# Patient Record
Sex: MALE | Race: BLACK OR AFRICAN AMERICAN | NOT HISPANIC OR LATINO | URBAN - METROPOLITAN AREA
[De-identification: names, ages, dates, MRNs, and addresses within clinical notes are randomized per-mention and may not be internally consistent; named-entity substitution may affect disease eponyms.]

---

## 2023-04-14 DIAGNOSIS — E11.9 TYPE 2 DIABETES MELLITUS WITHOUT COMPLICATION, UNSPECIFIED WHETHER LONG TERM INSULIN USE (MULTI): ICD-10-CM

## 2023-04-17 RX ORDER — SEMAGLUTIDE 2.68 MG/ML
INJECTION, SOLUTION SUBCUTANEOUS
Qty: 9 ML | Refills: 0 | Status: SHIPPED | OUTPATIENT
Start: 2023-04-17 | End: 2023-08-11

## 2023-04-29 ASSESSMENT — PROMIS GLOBAL HEALTH SCALE
CARRYOUT_PHYSICAL_ACTIVITIES: COMPLETELY
RATE_AVERAGE_PAIN: 2
RATE_AVERAGE_FATIGUE: MODERATE
RATE_QUALITY_OF_LIFE: VERY GOOD
RATE_SOCIAL_SATISFACTION: VERY GOOD
RATE_PHYSICAL_HEALTH: GOOD
EMOTIONAL_PROBLEMS: NEVER
RATE_MENTAL_HEALTH: VERY GOOD
CARRYOUT_SOCIAL_ACTIVITIES: EXCELLENT
RATE_GENERAL_HEALTH: GOOD

## 2023-05-01 ENCOUNTER — OFFICE VISIT (OUTPATIENT)
Dept: PRIMARY CARE | Facility: CLINIC | Age: 38
End: 2023-05-01
Payer: COMMERCIAL

## 2023-05-01 ENCOUNTER — LAB (OUTPATIENT)
Dept: LAB | Facility: LAB | Age: 38
End: 2023-05-01
Payer: COMMERCIAL

## 2023-05-01 VITALS
HEIGHT: 78 IN | HEART RATE: 81 BPM | TEMPERATURE: 97.1 F | BODY MASS INDEX: 36.45 KG/M2 | DIASTOLIC BLOOD PRESSURE: 88 MMHG | WEIGHT: 315 LBS | SYSTOLIC BLOOD PRESSURE: 138 MMHG | OXYGEN SATURATION: 97 %

## 2023-05-01 DIAGNOSIS — R40.0 DAYTIME SLEEPINESS: ICD-10-CM

## 2023-05-01 DIAGNOSIS — E11.9 TYPE 2 DIABETES MELLITUS WITHOUT COMPLICATION, WITHOUT LONG-TERM CURRENT USE OF INSULIN (MULTI): ICD-10-CM

## 2023-05-01 DIAGNOSIS — E66.01 CLASS 3 SEVERE OBESITY WITH SERIOUS COMORBIDITY AND BODY MASS INDEX (BMI) OF 45.0 TO 49.9 IN ADULT, UNSPECIFIED OBESITY TYPE (MULTI): ICD-10-CM

## 2023-05-01 DIAGNOSIS — Z00.00 WELLNESS EXAMINATION: Primary | ICD-10-CM

## 2023-05-01 PROBLEM — E55.9 VITAMIN D DEFICIENCY: Status: ACTIVE | Noted: 2023-05-01

## 2023-05-01 PROBLEM — M25.561 CHRONIC PAIN OF BOTH KNEES: Status: ACTIVE | Noted: 2023-05-01

## 2023-05-01 PROBLEM — M54.30 SCIATIC LEG PAIN: Status: ACTIVE | Noted: 2023-05-01

## 2023-05-01 PROBLEM — L72.3 SEBACEOUS CYST: Status: ACTIVE | Noted: 2023-05-01

## 2023-05-01 PROBLEM — D64.9 ABSOLUTE ANEMIA: Status: ACTIVE | Noted: 2023-05-01

## 2023-05-01 PROBLEM — J45.909 ASTHMA (HHS-HCC): Status: ACTIVE | Noted: 2023-05-01

## 2023-05-01 PROBLEM — K21.9 GERD (GASTROESOPHAGEAL REFLUX DISEASE): Status: ACTIVE | Noted: 2023-05-01

## 2023-05-01 PROBLEM — G89.29 CHRONIC PAIN OF BOTH KNEES: Status: ACTIVE | Noted: 2023-05-01

## 2023-05-01 PROBLEM — E66.09 EXOGENOUS OBESITY: Status: ACTIVE | Noted: 2023-05-01

## 2023-05-01 PROBLEM — R21 RASH OF FOOT: Status: ACTIVE | Noted: 2023-05-01

## 2023-05-01 PROBLEM — M25.562 CHRONIC PAIN OF BOTH KNEES: Status: ACTIVE | Noted: 2023-05-01

## 2023-05-01 PROBLEM — E03.8 SUBCLINICAL HYPOTHYROIDISM: Status: ACTIVE | Noted: 2023-05-01

## 2023-05-01 PROBLEM — R41.3 MEMORY CHANGE: Status: ACTIVE | Noted: 2023-05-01

## 2023-05-01 PROBLEM — M54.50 LOW BACK PAIN: Status: ACTIVE | Noted: 2023-05-01

## 2023-05-01 PROBLEM — R06.81 WITNESSED EPISODE OF APNEA: Status: ACTIVE | Noted: 2023-05-01

## 2023-05-01 PROBLEM — R53.82 CHRONIC FATIGUE: Status: ACTIVE | Noted: 2023-05-01

## 2023-05-01 LAB
ALANINE AMINOTRANSFERASE (SGPT) (U/L) IN SER/PLAS: 19 U/L (ref 10–52)
ALBUMIN (G/DL) IN SER/PLAS: 4.4 G/DL (ref 3.4–5)
ALKALINE PHOSPHATASE (U/L) IN SER/PLAS: 94 U/L (ref 33–120)
ANION GAP IN SER/PLAS: 11 MMOL/L (ref 10–20)
ASPARTATE AMINOTRANSFERASE (SGOT) (U/L) IN SER/PLAS: 27 U/L (ref 9–39)
BASOPHILS (10*3/UL) IN BLOOD BY AUTOMATED COUNT: 0.05 X10E9/L (ref 0–0.1)
BASOPHILS/100 LEUKOCYTES IN BLOOD BY AUTOMATED COUNT: 0.6 % (ref 0–2)
BILIRUBIN TOTAL (MG/DL) IN SER/PLAS: 0.6 MG/DL (ref 0–1.2)
CALCIUM (MG/DL) IN SER/PLAS: 10.4 MG/DL (ref 8.6–10.6)
CARBON DIOXIDE, TOTAL (MMOL/L) IN SER/PLAS: 33 MMOL/L (ref 21–32)
CHLORIDE (MMOL/L) IN SER/PLAS: 104 MMOL/L (ref 98–107)
CHOLESTEROL (MG/DL) IN SER/PLAS: 169 MG/DL (ref 0–199)
CHOLESTEROL IN HDL (MG/DL) IN SER/PLAS: 51.7 MG/DL
CHOLESTEROL IN LDL (MG/DL) IN SER/PLAS BY DIRECT ASSAY: 96 MG/DL (ref 0–129)
CHOLESTEROL/HDL RATIO: 3.3
CREATININE (MG/DL) IN SER/PLAS: 0.96 MG/DL (ref 0.5–1.3)
EOSINOPHILS (10*3/UL) IN BLOOD BY AUTOMATED COUNT: 0 X10E9/L (ref 0–0.7)
EOSINOPHILS/100 LEUKOCYTES IN BLOOD BY AUTOMATED COUNT: 0 % (ref 0–6)
ERYTHROCYTE DISTRIBUTION WIDTH (RATIO) BY AUTOMATED COUNT: 14 % (ref 11.5–14.5)
ERYTHROCYTE MEAN CORPUSCULAR HEMOGLOBIN CONCENTRATION (G/DL) BY AUTOMATED: 30.8 G/DL (ref 32–36)
ERYTHROCYTE MEAN CORPUSCULAR VOLUME (FL) BY AUTOMATED COUNT: 90 FL (ref 80–100)
ERYTHROCYTES (10*6/UL) IN BLOOD BY AUTOMATED COUNT: 4.78 X10E12/L (ref 4.5–5.9)
ESTIMATED AVERAGE GLUCOSE FOR HBA1C: 114 MG/DL
GFR MALE: >90 ML/MIN/1.73M2
GLUCOSE (MG/DL) IN SER/PLAS: 89 MG/DL (ref 74–99)
HEMATOCRIT (%) IN BLOOD BY AUTOMATED COUNT: 43.2 % (ref 41–52)
HEMOGLOBIN (G/DL) IN BLOOD: 13.3 G/DL (ref 13.5–17.5)
HEMOGLOBIN A1C/HEMOGLOBIN TOTAL IN BLOOD: 5.6 %
IMMATURE GRANULOCYTES/100 LEUKOCYTES IN BLOOD BY AUTOMATED COUNT: 0.2 % (ref 0–0.9)
LEUKOCYTES (10*3/UL) IN BLOOD BY AUTOMATED COUNT: 8.7 X10E9/L (ref 4.4–11.3)
LYMPHOCYTES (10*3/UL) IN BLOOD BY AUTOMATED COUNT: 2.3 X10E9/L (ref 1.2–4.8)
LYMPHOCYTES/100 LEUKOCYTES IN BLOOD BY AUTOMATED COUNT: 26.4 % (ref 13–44)
MONOCYTES (10*3/UL) IN BLOOD BY AUTOMATED COUNT: 0.57 X10E9/L (ref 0.1–1)
MONOCYTES/100 LEUKOCYTES IN BLOOD BY AUTOMATED COUNT: 6.6 % (ref 2–10)
NEUTROPHILS (10*3/UL) IN BLOOD BY AUTOMATED COUNT: 5.76 X10E9/L (ref 1.2–7.7)
NEUTROPHILS/100 LEUKOCYTES IN BLOOD BY AUTOMATED COUNT: 66.2 % (ref 40–80)
NON-HDL CHOLESTEROL: 117 MG/DL
NRBC (PER 100 WBCS) BY AUTOMATED COUNT: 0 /100 WBC (ref 0–0)
PLATELETS (10*3/UL) IN BLOOD AUTOMATED COUNT: 157 X10E9/L (ref 150–450)
POTASSIUM (MMOL/L) IN SER/PLAS: 5.5 MMOL/L (ref 3.5–5.3)
PROTEIN TOTAL: 7.9 G/DL (ref 6.4–8.2)
SODIUM (MMOL/L) IN SER/PLAS: 142 MMOL/L (ref 136–145)
UREA NITROGEN (MG/DL) IN SER/PLAS: 13 MG/DL (ref 6–23)

## 2023-05-01 PROCEDURE — 3008F BODY MASS INDEX DOCD: CPT | Performed by: FAMILY MEDICINE

## 2023-05-01 PROCEDURE — 83718 ASSAY OF LIPOPROTEIN: CPT

## 2023-05-01 PROCEDURE — 85025 COMPLETE CBC W/AUTO DIFF WBC: CPT

## 2023-05-01 PROCEDURE — 36415 COLL VENOUS BLD VENIPUNCTURE: CPT

## 2023-05-01 PROCEDURE — 83721 ASSAY OF BLOOD LIPOPROTEIN: CPT

## 2023-05-01 PROCEDURE — 84439 ASSAY OF FREE THYROXINE: CPT

## 2023-05-01 PROCEDURE — 82465 ASSAY BLD/SERUM CHOLESTEROL: CPT

## 2023-05-01 PROCEDURE — 3079F DIAST BP 80-89 MM HG: CPT | Performed by: FAMILY MEDICINE

## 2023-05-01 PROCEDURE — 84443 ASSAY THYROID STIM HORMONE: CPT

## 2023-05-01 PROCEDURE — 83036 HEMOGLOBIN GLYCOSYLATED A1C: CPT

## 2023-05-01 PROCEDURE — 3075F SYST BP GE 130 - 139MM HG: CPT | Performed by: FAMILY MEDICINE

## 2023-05-01 PROCEDURE — 99396 PREV VISIT EST AGE 40-64: CPT | Performed by: FAMILY MEDICINE

## 2023-05-01 PROCEDURE — 82043 UR ALBUMIN QUANTITATIVE: CPT

## 2023-05-01 PROCEDURE — 82570 ASSAY OF URINE CREATININE: CPT

## 2023-05-01 PROCEDURE — 1036F TOBACCO NON-USER: CPT | Performed by: FAMILY MEDICINE

## 2023-05-01 PROCEDURE — 80053 COMPREHEN METABOLIC PANEL: CPT

## 2023-05-01 PROCEDURE — 99213 OFFICE O/P EST LOW 20 MIN: CPT | Performed by: FAMILY MEDICINE

## 2023-05-01 RX ORDER — ACETAMINOPHEN 500 MG
1 TABLET ORAL DAILY
COMMUNITY
Start: 2016-07-19

## 2023-05-01 RX ORDER — METFORMIN HYDROCHLORIDE 500 MG/1
1 TABLET, EXTENDED RELEASE ORAL DAILY
COMMUNITY
Start: 2022-01-25 | End: 2023-09-05

## 2023-05-01 RX ORDER — DESVENLAFAXINE SUCCINATE 50 MG/1
TABLET, EXTENDED RELEASE ORAL
COMMUNITY
Start: 2023-03-28

## 2023-05-01 RX ORDER — CELECOXIB 200 MG/1
CAPSULE ORAL
COMMUNITY
Start: 2016-10-10 | End: 2023-08-21 | Stop reason: SDUPTHER

## 2023-05-01 ASSESSMENT — LIFESTYLE VARIABLES
HOW OFTEN DO YOU HAVE SIX OR MORE DRINKS ON ONE OCCASION: NEVER
SKIP TO QUESTIONS 9-10: 1
HOW OFTEN DO YOU HAVE A DRINK CONTAINING ALCOHOL: NEVER
AUDIT-C TOTAL SCORE: 0
HOW MANY STANDARD DRINKS CONTAINING ALCOHOL DO YOU HAVE ON A TYPICAL DAY: PATIENT DOES NOT DRINK

## 2023-05-01 ASSESSMENT — PATIENT HEALTH QUESTIONNAIRE - PHQ9
1. LITTLE INTEREST OR PLEASURE IN DOING THINGS: NOT AT ALL
SUM OF ALL RESPONSES TO PHQ9 QUESTIONS 1 & 2: 0
2. FEELING DOWN, DEPRESSED OR HOPELESS: NOT AT ALL

## 2023-05-01 NOTE — PROGRESS NOTES
"Subjective   Patient ID: Bert Abraham is a 38 y.o. male who presents for Annual Exam.    HPI     Depression  Followed by psych  Started on desvenlafaxin- states is helping    +Daytime sleepiness      Social:  [employer], thinking about change  - wife  No children, dog and cat  Hobbies/ likes- roleplaying/ 3d design/  board games      Review of Systems  All systems reviewed and neg if not noted in the HPI above   Objective   /88   Pulse 81   Temp 36.2 °C (97.1 °F)   Ht 1.981 m (6' 6\")   Wt (!) 191 kg (421 lb 9.6 oz)   SpO2 97%   BMI 48.72 kg/m²     Physical Exam  Pleasant  Eyes: conjunctiva non-icteric and eye lids are without obvious rash or drooping. Pupils are symmetric.   Ears, Nose, Mouth, and Throat: External ears and nose appear to be without deformity or rash. No lesions or masses noted. Hearing is grossly intact.   CV: RRR, no murmur  Carotids: no bruits  Pulm:CTA B/L  Abd: soft, NTTP, + BS  LE: no edema  Psychiatric: Alert, orientation to person, place, and time. Recent/remote memory as evidenced through face-to-face interaction and discussion appear grossly intact. Mood and affect are normal.     Assessment/Plan   Problem List Items Addressed This Visit          Nervous    Daytime sleepiness    Relevant Orders    Referral to Adult Sleep Medicine    In-Center Sleep Study (Non-Sleep Provider Only)     Other Visit Diagnoses       Wellness examination    -  Primary    Type 2 diabetes mellitus without complication, without long-term current use of insulin (CMS/HCC)        Relevant Orders    Albumin , Urine Random    CBC and Auto Differential    Comprehensive Metabolic Panel    Hemoglobin A1C    TSH with reflex to Free T4 if abnormal    Lipid Panel Non-Fasting    Cholesterol, LDL Direct    Class 3 severe obesity with serious comorbidity and body mass index (BMI) of 45.0 to 49.9 in adult, unspecified obesity type (CMS/HCC)        Relevant Orders    Albumin , Urine Random    CBC and Auto " Differential    Comprehensive Metabolic Panel    Hemoglobin A1C    TSH with reflex to Free T4 if abnormal    Lipid Panel Non-Fasting    Cholesterol, LDL Direct

## 2023-05-01 NOTE — PATIENT INSTRUCTIONS
Labs today    Ordered sleep study  Referral for sleepmed    Continue the ozempic  Congrats on your 70+ wt loss  Continue to work on healthy diet and exercise  We encourage all patients to engage in exercise 150 minutes per week   Adults 18 and older, activity during each week - if you are able:    Engage in at least 150 minutes of moderate or vigorous exercise per week   If you are able- Engage in muscle strengthening activity on 2 or more days per week          Please follow up in 6months for DM/ sleep or as needed.       ** If labs or imaging ordered at today's visit, all the non-urgent results will be discussed at your next visit    If you have been referred for a special test or to a specialist please call  [phone] to schedule an appointment.  If you have any further questions, or if develop new or worsened symptoms, please give our office a call at [phone].

## 2023-05-02 LAB
ALBUMIN (MG/L) IN URINE: 8 MG/L
ALBUMIN/CREATININE (UG/MG) IN URINE: 4.6 UG/MG CRT (ref 0–30)
CREATININE (MG/DL) IN URINE: 174 MG/DL (ref 20–370)
THYROTROPIN (MIU/L) IN SER/PLAS BY DETECTION LIMIT <= 0.05 MIU/L: 4.92 MIU/L (ref 0.44–3.98)
THYROXINE (T4) FREE (NG/DL) IN SER/PLAS: 1.05 NG/DL (ref 0.78–1.48)

## 2023-05-10 LAB
ALBUMIN (MG/L) IN URINE: NORMAL MG/L
ALBUMIN/CREATININE (UG/MG) IN URINE: NORMAL UG/MG CRT (ref 0–30)
CREATININE (MG/DL) IN URINE: NORMAL MG/DL (ref 20–370)

## 2023-07-31 DIAGNOSIS — E11.9 TYPE 2 DIABETES MELLITUS WITHOUT COMPLICATION, UNSPECIFIED WHETHER LONG TERM INSULIN USE (MULTI): ICD-10-CM

## 2023-08-11 RX ORDER — SEMAGLUTIDE 2.68 MG/ML
2 INJECTION, SOLUTION SUBCUTANEOUS
Qty: 9 ML | Refills: 1 | Status: SHIPPED
Start: 2023-08-11 | End: 2023-11-13 | Stop reason: ALTCHOICE

## 2023-08-21 DIAGNOSIS — G89.29 CHRONIC PAIN OF BOTH KNEES: ICD-10-CM

## 2023-08-21 DIAGNOSIS — M25.562 CHRONIC PAIN OF BOTH KNEES: ICD-10-CM

## 2023-08-21 DIAGNOSIS — E11.9 TYPE 2 DIABETES MELLITUS WITHOUT COMPLICATION, UNSPECIFIED WHETHER LONG TERM INSULIN USE (MULTI): Primary | ICD-10-CM

## 2023-08-21 DIAGNOSIS — M25.561 CHRONIC PAIN OF BOTH KNEES: ICD-10-CM

## 2023-08-21 DIAGNOSIS — M54.50 LOW BACK PAIN, UNSPECIFIED BACK PAIN LATERALITY, UNSPECIFIED CHRONICITY, UNSPECIFIED WHETHER SCIATICA PRESENT: Primary | ICD-10-CM

## 2023-08-30 RX ORDER — CELECOXIB 200 MG/1
200 CAPSULE ORAL
Qty: 90 CAPSULE | Refills: 1 | Status: SHIPPED | OUTPATIENT
Start: 2023-08-30

## 2023-09-05 RX ORDER — CELECOXIB 200 MG/1
200 CAPSULE ORAL DAILY PRN
Qty: 90 CAPSULE | Refills: 1 | OUTPATIENT
Start: 2023-09-05

## 2023-09-05 RX ORDER — METFORMIN HYDROCHLORIDE 500 MG/1
500 TABLET, EXTENDED RELEASE ORAL DAILY
Qty: 90 TABLET | Refills: 2 | Status: SHIPPED | OUTPATIENT
Start: 2023-09-05

## 2023-11-13 ENCOUNTER — LAB (OUTPATIENT)
Dept: LAB | Facility: LAB | Age: 38
End: 2023-11-13
Payer: COMMERCIAL

## 2023-11-13 ENCOUNTER — OFFICE VISIT (OUTPATIENT)
Dept: PRIMARY CARE | Facility: CLINIC | Age: 38
End: 2023-11-13
Payer: COMMERCIAL

## 2023-11-13 VITALS
BODY MASS INDEX: 36.45 KG/M2 | SYSTOLIC BLOOD PRESSURE: 128 MMHG | DIASTOLIC BLOOD PRESSURE: 70 MMHG | WEIGHT: 315 LBS | HEIGHT: 78 IN

## 2023-11-13 DIAGNOSIS — G89.29 CHRONIC LOW BACK PAIN WITHOUT SCIATICA, UNSPECIFIED BACK PAIN LATERALITY: ICD-10-CM

## 2023-11-13 DIAGNOSIS — R40.0 DAYTIME SLEEPINESS: ICD-10-CM

## 2023-11-13 DIAGNOSIS — G89.29 CHRONIC PAIN OF LEFT KNEE: ICD-10-CM

## 2023-11-13 DIAGNOSIS — I83.92 VARICOSE VEINS OF LEFT LOWER EXTREMITY, UNSPECIFIED WHETHER COMPLICATED: ICD-10-CM

## 2023-11-13 DIAGNOSIS — E03.8 SUBCLINICAL HYPOTHYROIDISM: ICD-10-CM

## 2023-11-13 DIAGNOSIS — M54.50 CHRONIC LOW BACK PAIN WITHOUT SCIATICA, UNSPECIFIED BACK PAIN LATERALITY: ICD-10-CM

## 2023-11-13 DIAGNOSIS — M25.562 CHRONIC PAIN OF LEFT KNEE: ICD-10-CM

## 2023-11-13 DIAGNOSIS — D64.9 ANEMIA, UNSPECIFIED TYPE: ICD-10-CM

## 2023-11-13 DIAGNOSIS — R73.03 PREDIABETES: Primary | ICD-10-CM

## 2023-11-13 DIAGNOSIS — R73.03 PREDIABETES: ICD-10-CM

## 2023-11-13 LAB
ANION GAP SERPL CALC-SCNC: 13 MMOL/L (ref 10–20)
BASOPHILS # BLD AUTO: 0.04 X10*3/UL (ref 0–0.1)
BASOPHILS NFR BLD AUTO: 0.5 %
BUN SERPL-MCNC: 12 MG/DL (ref 6–23)
CALCIUM SERPL-MCNC: 9.6 MG/DL (ref 8.6–10.6)
CHLORIDE SERPL-SCNC: 106 MMOL/L (ref 98–107)
CO2 SERPL-SCNC: 28 MMOL/L (ref 21–32)
CREAT SERPL-MCNC: 1.1 MG/DL (ref 0.5–1.3)
EOSINOPHIL # BLD AUTO: 0 X10*3/UL (ref 0–0.7)
EOSINOPHIL NFR BLD AUTO: 0 %
ERYTHROCYTE [DISTWIDTH] IN BLOOD BY AUTOMATED COUNT: 14.2 % (ref 11.5–14.5)
GFR SERPL CREATININE-BSD FRML MDRD: 87 ML/MIN/1.73M*2
GLUCOSE SERPL-MCNC: 81 MG/DL (ref 74–99)
HCT VFR BLD AUTO: 40.9 % (ref 41–52)
HGB BLD-MCNC: 12.6 G/DL (ref 13.5–17.5)
IMM GRANULOCYTES # BLD AUTO: 0.04 X10*3/UL (ref 0–0.7)
IMM GRANULOCYTES NFR BLD AUTO: 0.5 % (ref 0–0.9)
IRON SATN MFR SERPL: 25 % (ref 25–45)
IRON SERPL-MCNC: 71 UG/DL (ref 35–150)
LYMPHOCYTES # BLD AUTO: 2.28 X10*3/UL (ref 1.2–4.8)
LYMPHOCYTES NFR BLD AUTO: 26.2 %
MCH RBC QN AUTO: 28.4 PG (ref 26–34)
MCHC RBC AUTO-ENTMCNC: 30.8 G/DL (ref 32–36)
MCV RBC AUTO: 92 FL (ref 80–100)
MONOCYTES # BLD AUTO: 0.74 X10*3/UL (ref 0.1–1)
MONOCYTES NFR BLD AUTO: 8.5 %
NEUTROPHILS # BLD AUTO: 5.6 X10*3/UL (ref 1.2–7.7)
NEUTROPHILS NFR BLD AUTO: 64.3 %
NRBC BLD-RTO: 0 /100 WBCS (ref 0–0)
PLATELET # BLD AUTO: 201 X10*3/UL (ref 150–450)
POTASSIUM SERPL-SCNC: 4.8 MMOL/L (ref 3.5–5.3)
RBC # BLD AUTO: 4.43 X10*6/UL (ref 4.5–5.9)
SODIUM SERPL-SCNC: 142 MMOL/L (ref 136–145)
T3FREE SERPL-MCNC: 3.2 PG/ML (ref 2.3–4.2)
T4 FREE SERPL-MCNC: 0.91 NG/DL (ref 0.78–1.48)
TIBC SERPL-MCNC: 289 UG/DL (ref 240–445)
TSH SERPL-ACNC: 9.39 MIU/L (ref 0.44–3.98)
UIBC SERPL-MCNC: 218 UG/DL (ref 110–370)
WBC # BLD AUTO: 8.7 X10*3/UL (ref 4.4–11.3)

## 2023-11-13 PROCEDURE — 84439 ASSAY OF FREE THYROXINE: CPT

## 2023-11-13 PROCEDURE — 85025 COMPLETE CBC W/AUTO DIFF WBC: CPT

## 2023-11-13 PROCEDURE — 84443 ASSAY THYROID STIM HORMONE: CPT

## 2023-11-13 PROCEDURE — 84481 FREE ASSAY (FT-3): CPT

## 2023-11-13 PROCEDURE — 3008F BODY MASS INDEX DOCD: CPT | Performed by: FAMILY MEDICINE

## 2023-11-13 PROCEDURE — 80048 BASIC METABOLIC PNL TOTAL CA: CPT

## 2023-11-13 PROCEDURE — 36415 COLL VENOUS BLD VENIPUNCTURE: CPT

## 2023-11-13 PROCEDURE — 83550 IRON BINDING TEST: CPT

## 2023-11-13 PROCEDURE — 99214 OFFICE O/P EST MOD 30 MIN: CPT | Performed by: FAMILY MEDICINE

## 2023-11-13 PROCEDURE — 83540 ASSAY OF IRON: CPT

## 2023-11-13 PROCEDURE — 1036F TOBACCO NON-USER: CPT | Performed by: FAMILY MEDICINE

## 2023-11-13 ASSESSMENT — ENCOUNTER SYMPTOMS
DEPRESSION: 0
OCCASIONAL FEELINGS OF UNSTEADINESS: 0
LOSS OF SENSATION IN FEET: 0

## 2023-11-13 ASSESSMENT — PAIN SCALES - GENERAL: PAINLEVEL: 5

## 2023-11-13 NOTE — PATIENT INSTRUCTIONS
Varicose veins  - referral for vascular    Knee  - xray ordered  - referral for ortho  Ok to continue diclofenac and celebrex    Back  - try PT   - Ok to continue diclofenac and celebrex    Checking labs for the thyroid  - if still off will place referral for endo    Daytime sleepiness  -referral sleep med team and sleep study      Please follow up in 4-5months for knee/ back or as needed.       ** If labs or imaging ordered at today's visit, all the non-urgent results will be discussed at your next visit    If you have been referred for a special test or to a specialist please call  [phone] to schedule an appointment.  If you have any further questions, or if develop new or worsened symptoms, please give our office a call at [phone].

## 2023-11-13 NOTE — PROGRESS NOTES
"Subjective   Patient ID: Bert Abraham is a 38 y.o. male who presents for Follow-up, Knee Pain (3/10 x3 month), and Back Pain (5/10 x6 weeks).    HPI       Knee Pain (3/10 x3 month)  Pain and \"puffy\" under the knee cap  Can feel it \"swelling\"  No trauma to the area  Pain is worsened with stairs and sitting ( with flexion)  Using diclofenac and celebrex which help some    Back Pain (5/10 x6 weeks).  Worsened with bending and twisting, heavy lifting  prednisone helps  Using diclofenac and celebrex which help some  Denies loss of control of bowel or bladder, saddle paresthesia, or leg paresthesia  Questions if with leg weakness??    Still with daytime sleepiness  Referral for sleep med and study needed again    Review of Systems  All systems reviewed and neg if not noted in the HPI above     Objective   /70 (BP Location: Left arm, Patient Position: Sitting, BP Cuff Size: Large adult)   Ht 1.981 m (6' 6\")   Wt (!) 204 kg (449 lb 8 oz)   BMI 51.94 kg/m²     Physical Exam    Patient appears to be in no pain, non-antalgic gait noted.   paraspinal muscles TTP at L5-S1  No masses.  Painful and reduced LS ROM noted.   Straight leg raise is neg   DTR's, motor strength and sensation normal, including heel and toe gait.    Peripheral pulses are palpable.    Knee with swelling at the lateral joint line on the left knee.  There are several varicose veins over the area and throughout the leg itself  Pain with flexion of the knee    Assessment/Plan   Problem List Items Addressed This Visit             ICD-10-CM    Absolute anemia D64.9    Relevant Orders    Iron and TIBC    Daytime sleepiness R40.0    Relevant Orders    In-Center Sleep Study (Non-Sleep Provider Only)    Referral to Adult Sleep Medicine    CBC and Auto Differential    Basic Metabolic Panel    TSH    T4, free    T3, free    Iron and TIBC    Low back pain M54.50    Relevant Orders    CBC and Auto Differential    Basic Metabolic Panel    Referral to Physical " Therapy    Subclinical hypothyroidism E03.8    Relevant Orders    TSH    T4, free    T3, free     Other Visit Diagnoses         Codes    Prediabetes    -  Primary R73.03    Relevant Orders    CBC and Auto Differential    Basic Metabolic Panel    TSH    Chronic pain of left knee     M25.562, G89.29    Relevant Orders    Referral to Orthopaedic Surgery    XR knee left 3 views    Varicose veins of left lower extremity, unspecified whether complicated     I83.92    Relevant Orders    Referral to Vascular Medicine          Please follow up in 4-5months for knee/ back or as needed.   Time Spent  Time spent directly with patient, family or caregiver: 25 minutes  Documentation Time: 5 minutes

## 2023-12-06 ENCOUNTER — ANCILLARY PROCEDURE (OUTPATIENT)
Dept: RADIOLOGY | Facility: CLINIC | Age: 38
End: 2023-12-06
Payer: COMMERCIAL

## 2023-12-06 DIAGNOSIS — M25.562 CHRONIC PAIN OF LEFT KNEE: ICD-10-CM

## 2023-12-06 DIAGNOSIS — G89.29 CHRONIC PAIN OF LEFT KNEE: ICD-10-CM

## 2023-12-06 PROCEDURE — 73562 X-RAY EXAM OF KNEE 3: CPT | Mod: LEFT SIDE | Performed by: RADIOLOGY

## 2023-12-06 PROCEDURE — 73562 X-RAY EXAM OF KNEE 3: CPT | Mod: LT,FY

## 2023-12-07 ENCOUNTER — APPOINTMENT (OUTPATIENT)
Dept: RADIOLOGY | Facility: CLINIC | Age: 38
End: 2023-12-07
Payer: COMMERCIAL

## 2023-12-07 ENCOUNTER — OFFICE VISIT (OUTPATIENT)
Dept: ORTHOPEDIC SURGERY | Facility: CLINIC | Age: 38
End: 2023-12-07
Payer: COMMERCIAL

## 2023-12-07 VITALS — BODY MASS INDEX: 36.45 KG/M2 | WEIGHT: 315 LBS | HEIGHT: 78 IN

## 2023-12-07 DIAGNOSIS — M25.562 LEFT KNEE PAIN, UNSPECIFIED CHRONICITY: ICD-10-CM

## 2023-12-07 DIAGNOSIS — M25.462 EFFUSION OF LEFT KNEE: Primary | ICD-10-CM

## 2023-12-07 DIAGNOSIS — M17.10 ARTHRITIS OF KNEE: ICD-10-CM

## 2023-12-07 DIAGNOSIS — M25.562 CHRONIC PAIN OF LEFT KNEE: ICD-10-CM

## 2023-12-07 DIAGNOSIS — G89.29 CHRONIC PAIN OF LEFT KNEE: ICD-10-CM

## 2023-12-07 PROCEDURE — 99204 OFFICE O/P NEW MOD 45 MIN: CPT | Performed by: ORTHOPAEDIC SURGERY

## 2023-12-07 PROCEDURE — 20610 DRAIN/INJ JOINT/BURSA W/O US: CPT | Performed by: ORTHOPAEDIC SURGERY

## 2023-12-07 PROCEDURE — 99214 OFFICE O/P EST MOD 30 MIN: CPT | Performed by: ORTHOPAEDIC SURGERY

## 2023-12-07 PROCEDURE — 3008F BODY MASS INDEX DOCD: CPT | Performed by: ORTHOPAEDIC SURGERY

## 2023-12-07 PROCEDURE — 1036F TOBACCO NON-USER: CPT | Performed by: ORTHOPAEDIC SURGERY

## 2023-12-07 PROCEDURE — 2500000004 HC RX 250 GENERAL PHARMACY W/ HCPCS (ALT 636 FOR OP/ED): Performed by: ORTHOPAEDIC SURGERY

## 2023-12-07 PROCEDURE — 2500000005 HC RX 250 GENERAL PHARMACY W/O HCPCS: Performed by: ORTHOPAEDIC SURGERY

## 2023-12-07 RX ORDER — METHYLPREDNISOLONE ACETATE 40 MG/ML
40 INJECTION, SUSPENSION INTRA-ARTICULAR; INTRALESIONAL; INTRAMUSCULAR; SOFT TISSUE
Status: COMPLETED | OUTPATIENT
Start: 2023-12-07 | End: 2023-12-07

## 2023-12-07 RX ORDER — LIDOCAINE HYDROCHLORIDE 20 MG/ML
2 INJECTION, SOLUTION INFILTRATION; PERINEURAL
Status: COMPLETED | OUTPATIENT
Start: 2023-12-07 | End: 2023-12-07

## 2023-12-07 RX ADMIN — LIDOCAINE HYDROCHLORIDE 2 ML: 20 INJECTION, SOLUTION INFILTRATION; PERINEURAL at 17:32

## 2023-12-07 RX ADMIN — METHYLPREDNISOLONE ACETATE 40 MG: 40 INJECTION, SUSPENSION INTRALESIONAL; INTRAMUSCULAR; INTRASYNOVIAL; SOFT TISSUE at 17:32

## 2023-12-07 ASSESSMENT — PAIN DESCRIPTION - DESCRIPTORS: DESCRIPTORS: ACHING;SHARP

## 2023-12-07 ASSESSMENT — PAIN SCALES - GENERAL: PAINLEVEL_OUTOF10: 4

## 2023-12-07 ASSESSMENT — PAIN - FUNCTIONAL ASSESSMENT: PAIN_FUNCTIONAL_ASSESSMENT: 0-10

## 2023-12-07 NOTE — PROGRESS NOTES
History of Present Illness  The patient presents with side: left knee pain for 6 months.  The patient complains of worsening pain over the past 6 months.  At present he has tried the following conservative measures including rest, activity modification, oral and topical anti-inflammatories with minimal effect.  He is here interested in any other possible therapeutic intervention.    Smoking  No    BMI  Current BMI is 52    Pain is described as aching    Location: lateral, global  Pain level: 4  Assistive device: is not using any ambulatory assistive devices  History of surgery: No       Review of Systems   GENERAL: Negative for malaise, significant weight loss, fever  MUSCULOSKELETAL: see HPI  NEURO:  Negative     Exam  side: right Knee:  Skin healthy and intact  No gross swelling or ecchymosis  Alignment: normal     Effusion: moderate        ROM: 0 and 130  mild Crepitus with range of motion  Tenderness to palpation over medial and lateral joint line and with patellar compression      No laxity to valgus stress  No laxity to varus stress  Negative Lachman´s test  Negative posterior drawer test  Mild pain with Briana´s test  Logrolling of hip negative  No pain with internal rotation of the hip  Straight leg raise negative  Neurovascular exam normal distally  2+ DP pulse and good cap refill     Radiographs  See dictated report from today     Assessment  Osteoarthrosis side: right knee      Plan  We discussed with the patient the diagnosis of degenerative joint disease of the knee.  We reviewed an evidence-based approach to osteoarthritis of the knee.  We strongly encouraged low-impact aerobic activity and non-opioid analgesics.     We discussed temporary pain relief with corticosteroid injections and the associated risks.  We also discussed the conflicting evidence regarding viscosupplementation and potential long-term risks with NSAID´s.  We reviewed the role of bracing for instability and physical therapy for  atrophy and gait abnormalities.  We reviewed that the only curative process is for a joint arthroplasty though the patient at this time is too young.  The patient was also thoroughly counseled on the effects of weight management. A bariatric referral was placed to help address this.     Under sterile conditions, needle was inserted into the right knee and approximately 10 cc of synovial fluid was aspirated from the knee.  At the same time, a 4:1 mixture of Kenalog and lidocaine was injected into the knee.  The wounds were clean afterwards, and a bandage applied over injection site.     The patient elected for Rest, ice, elevation, Physical therapy, NSAIDS, and Injections    I will see them in 1 month for recheck, sooner if there is any problems.  Questions were answered.    Patient was reviewed and discussed with GLORIA and/or orthopedic resident.  Patient was seen and evaluated in conjunction with GLORIA and/or orthopedic resident. Findings and treatment plan were discussed and approved by myself, Dr. Landeros.  He is working on weight loss.  We discussed the possible bariatric program.  Hopefully the injection can calm his knee down to allow him to burn more calories.  Recommend he follow-up with us in the future if pain worsens.  Can consider repeat injection in the future but advised delay than 4 to 6 months if possible.    Patient ID: Bert Abraham is a 38 y.o. male.    L Inj/Asp: L knee on 12/7/2023 5:32 PM  Indications: pain, joint swelling and diagnostic evaluation  Details: 21 G needle, anterolateral approach  Medications: 40 mg methylPREDNISolone acetate 40 mg/mL; 2 mL lidocaine 20 mg/mL (2 %)  Outcome: tolerated well, no immediate complications  Procedure, treatment alternatives, risks and benefits explained, specific risks discussed. Consent was given by the patient. Immediately prior to procedure a time out was called to verify the correct patient, procedure, equipment, support staff and site/side marked as  required. Patient was prepped and draped in the usual sterile fashion.

## 2024-02-20 ENCOUNTER — APPOINTMENT (OUTPATIENT)
Dept: SLEEP MEDICINE | Facility: HOSPITAL | Age: 39
End: 2024-02-20
Payer: COMMERCIAL

## 2024-03-13 ENCOUNTER — APPOINTMENT (OUTPATIENT)
Dept: PRIMARY CARE | Facility: CLINIC | Age: 39
End: 2024-03-13

## 2024-03-14 ENCOUNTER — CLINICAL SUPPORT (OUTPATIENT)
Dept: SLEEP MEDICINE | Facility: CLINIC | Age: 39
End: 2024-03-14
Payer: COMMERCIAL

## 2024-03-14 DIAGNOSIS — R40.0 DAYTIME SLEEPINESS: ICD-10-CM

## 2024-03-14 DIAGNOSIS — G47.33 OBSTRUCTIVE SLEEP APNEA (ADULT) (PEDIATRIC): ICD-10-CM

## 2024-03-14 PROCEDURE — 95810 POLYSOM 6/> YRS 4/> PARAM: CPT | Performed by: GENERAL PRACTICE

## 2024-03-15 VITALS
HEIGHT: 77 IN | DIASTOLIC BLOOD PRESSURE: 85 MMHG | SYSTOLIC BLOOD PRESSURE: 147 MMHG | BODY MASS INDEX: 37.19 KG/M2 | WEIGHT: 315 LBS

## 2024-03-15 NOTE — PROGRESS NOTES
[facility] TECH NOTE:     Patient: Bert Abraham   MRN//AGE: [id number]  1985  39 y.o.   Technologist: Pal Fuentes   Room: 439A   Service Date: 3/15/2024        Sleep Testing Location: [facility]    Cougar: 7, Neck 50cm    TECHNOLOGIST SLEEP STUDY PROCEDURE NOTE:   This sleep study is being conducted according to the policies and procedures outlined by the [employer] accreditation standards.  The sleep study procedure and processes involved during this appointment was explained to the patient/patient’s family, questions were answered. The patient/family verbalized understanding.      The patient is a 39 y.o. year old male scheduled for a PSG  with montage of:  PSG . He arrived for his appointment.      The study that was ultimately completed was a PSG  with montage of:  PSG .    The full study Was completed.  Patient questionnaires completed?: yes     Consents signed? yes    Initial Fall Risk Screening:     Bert has not fallen in the last 6 months. his did not result in injury. Bert does not have a fear of falling. He does not need assistance with sitting, standing, or walking. He does not need assistance walking in his home. He does not need assistance in an unfamiliar setting. The patient is not using an assistive device.     Brief Study observations: Patient is a 39 year old male, here today for a PSG due to not meeting Split night criteria.     Deviation to order/protocol and reason: None      If PAP, which was preferred mask/pressure/mode:       Other:None    After the procedure, the patient/family was informed to ensure followup with ordering clinician for testing results.      Technologist: Pal Fuentes

## 2024-03-20 ENCOUNTER — APPOINTMENT (OUTPATIENT)
Dept: RADIOLOGY | Facility: HOSPITAL | Age: 39
End: 2024-03-20
Payer: COMMERCIAL

## 2024-03-20 ENCOUNTER — HOSPITAL ENCOUNTER (EMERGENCY)
Facility: HOSPITAL | Age: 39
Discharge: HOME | End: 2024-03-20
Attending: EMERGENCY MEDICINE
Payer: COMMERCIAL

## 2024-03-20 VITALS
HEART RATE: 71 BPM | BODY MASS INDEX: 36.45 KG/M2 | TEMPERATURE: 98.6 F | DIASTOLIC BLOOD PRESSURE: 90 MMHG | OXYGEN SATURATION: 99 % | WEIGHT: 315 LBS | SYSTOLIC BLOOD PRESSURE: 139 MMHG | HEIGHT: 78 IN | RESPIRATION RATE: 20 BRPM

## 2024-03-20 DIAGNOSIS — M79.604 MUSCULOSKELETAL PAIN OF LOWER EXTREMITY, RIGHT: Primary | ICD-10-CM

## 2024-03-20 PROCEDURE — 99284 EMERGENCY DEPT VISIT MOD MDM: CPT | Mod: 25

## 2024-03-20 PROCEDURE — 93971 EXTREMITY STUDY: CPT | Performed by: RADIOLOGY

## 2024-03-20 PROCEDURE — 93971 EXTREMITY STUDY: CPT

## 2024-03-20 ASSESSMENT — PAIN DESCRIPTION - LOCATION
LOCATION: LEG
LOCATION: LEG

## 2024-03-20 ASSESSMENT — PAIN - FUNCTIONAL ASSESSMENT
PAIN_FUNCTIONAL_ASSESSMENT: 0-10
PAIN_FUNCTIONAL_ASSESSMENT: 0-10

## 2024-03-20 ASSESSMENT — PAIN DESCRIPTION - ORIENTATION
ORIENTATION: RIGHT
ORIENTATION: RIGHT

## 2024-03-20 ASSESSMENT — COLUMBIA-SUICIDE SEVERITY RATING SCALE - C-SSRS
2. HAVE YOU ACTUALLY HAD ANY THOUGHTS OF KILLING YOURSELF?: NO
6. HAVE YOU EVER DONE ANYTHING, STARTED TO DO ANYTHING, OR PREPARED TO DO ANYTHING TO END YOUR LIFE?: NO
1. IN THE PAST MONTH, HAVE YOU WISHED YOU WERE DEAD OR WISHED YOU COULD GO TO SLEEP AND NOT WAKE UP?: NO

## 2024-03-20 ASSESSMENT — PAIN DESCRIPTION - DESCRIPTORS: DESCRIPTORS: ACHING;SHARP

## 2024-03-20 ASSESSMENT — PAIN SCALES - GENERAL
PAINLEVEL_OUTOF10: 5 - MODERATE PAIN
PAINLEVEL_OUTOF10: 8

## 2024-03-20 ASSESSMENT — PAIN DESCRIPTION - PAIN TYPE
TYPE: ACUTE PAIN
TYPE: ACUTE PAIN

## 2024-03-20 NOTE — ED TRIAGE NOTES
Pt presents to ED for RLE pain x1 week worsening today with swelling and redness to R calf. Denies SOB/CP, dizziness, abd pain, numbness/ tingling. Pt reports hx of varicose vein removal and residual numbness and reports increase in numbness to RLE today and resolved currently. Hx of sciatica. Pt ambulatory in triage.

## 2024-03-20 NOTE — ED TRIAGE NOTES
TRIAGE NOTE   I saw the patient as the Clinician in Triage and performed a brief history and physical exam, established acuity, and ordered appropriate tests to develop basic plan of care. Patient will be seen by an GLORIA, resident and/or physician who will independently evaluate the patient. Please see subsequent provider notes for further details and disposition.     Brief HPI: In brief, Bert Abraham is a 39 y.o. male that presents for RLE pain and swelling that has been bothering him for a month. Acutely worsened today.  Patient has tried NSAIDs, Flexeril, and steroids that he has because he also has issues with sciatica.  Patient has some numbness in that leg but this is normal from his sciatica and does not feel that it is worse today.  He feels that this pain is different.  No recent long distance travel or history of DVT.    Focused Physical exam:   Swelling to right leg and pain near gastrocnemius, normal pulses     Plan/MDM:   Will obtain duplex of the right lower extremity.    Please see subsequent provider note for further details and disposition     Pia Calhoun PA-C

## 2024-03-21 NOTE — ED PROVIDER NOTES
HPI   Chief Complaint   Patient presents with    Leg Pain       HPI: []  39-year-old  male history of high BMI, sciatica comes in with atraumatic right extremity pain.  He said for last few days he developed right extremity pain.  He thought it was sciatica he is on prednisone and Celebrex pain are getting better.  He was concerned for DVT.  History of varicose veins.  No trauma no falls.  No back pain.  No calf pain.  No chest pain pressure heaviness fever chills nausea vomit diarrhea cough congestion incontinence seizures no trouble breathing no syncope or near syncope no recent travel hospitalization or antibiotic use.    Past history: Sciatica, high BMI, varicose veins  Social: Patient denies current tobacco alcohol drug abuse.  REVIEW OF SYSTEMS:    GENERAL.: No weight loss, fatigue, anorexia, insomnia, fever.    EYES: No vision loss, double vision, drainage, eye pain.    ENT: No pharyngitis, dry mouth.    CARDIOPULMONARY: No chest pain, palpitations, syncope, near syncope. No shortness of breath, cough, hemoptysis.    GI: No abdominal pain, change in bowel habits, melena, hematemesis, hematochezia, nausea, vomiting, diarrhea.    : No discharge, dysuria, frequency, urgency, hematuria.    MS: Positive right lower extremity pain, no, joint pain, joint swelling.    SKIN: No rashes.    PSYCH: No depression, anxiety, suicidality, homicidality.    Review of systems is otherwise negative unless stated above or in history of present illness.  Social history, family history, allergies reviewed.  PHYSICAL EXAM:    GENERAL: Vitals noted, no distress. Alert and oriented  x 3. Non-toxic.  High BMI exam limited    EENT: TMs clear. Posterior oropharynx unremarkable. No meningismus. No LAD.     NECK: Supple. Nontender. No midline tenderness.     CARDIAC: Regular, rate, rhythm. No murmurs rubs or gallops. No JVD    PULMONARY: Lungs clear bilaterally with good aeration. No wheezes rales or rhonchi. No respiratory  distress.     ABDOMEN: Soft, nonsurgical. Nontender. No peritoneal signs. Normoactive bowel sounds. No pulsatile masses.     EXTREMITIES: Left lower extremity no peripheral edema. Negative Homans bilaterally, no cords.  2+ bounding pulses well-perfused  Right lower extremity has varicose veins, no obvious deformity no palpable cords calf is supple soft compartments bounding pulses neurovascular intact is tender palpation over the lateral calf area.  With no palpable muscle defect erythema redness induration.  SKIN: No rash. Intact.   Musculoskeletal: No midline C, T, L-spine tenderness.  No paravertebral tenderness.  NEURO: No focal neurologic deficits, NIH score of 0. Cranial nerves normal as tested from II through XII.  Negative straight leg lower extremities bilaterally.    MEDICAL DECISION MAKING:  Ultrasound right lower extremity negative DVT.    Treatment ED: None    ED course: Patient remained stable hemodynamically.    Impression: Acute musculoskeletal pain    Plan set MDM: 39-year-old male high BMI history varicose veins comes in atraumatic right extremity pain appears mechanical musculoskeletal low concern for DVT compartment syndrome neurovascular compromise or a sciatica.  DVT ultrasound is negative patient reassured be discharged with supportive care leg elevation NSAIDs with pain control close outpatient follow-up instructed with caution.                          No data recorded                   Patient History   Past Medical History:   Diagnosis Date    Crushing injury of right great toe, sequela 10/10/2016    Crushing injury of great toe, right, sequela    Other chronic diseases of tonsils and adenoids 07/13/2016    Tonsil stone    Pain in left ankle and joints of left foot 03/01/2017    Left ankle pain, unspecified chronicity    Pain in left leg 03/01/2017    Musculoskeletal leg pain, left    Pain in left shoulder 03/01/2017    Left shoulder pain, unspecified chronicity    Personal history of  other specified conditions 07/13/2016    History of chronic fatigue    Personal history of other specified conditions 07/13/2016    History of snoring    Sprain of ligaments of lumbar spine, initial encounter 07/13/2016    Low back sprain     No past surgical history on file.  No family history on file.  Social History     Tobacco Use    Smoking status: Never    Smokeless tobacco: Never   Substance Use Topics    Alcohol use: Never    Drug use: Never       Physical Exam   ED Triage Vitals [03/20/24 1701]   Temperature Heart Rate Respirations BP   37 °C (98.6 °F) 73 20 148/74      Pulse Ox Temp Source Heart Rate Source Patient Position   100 % Oral Monitor --      BP Location FiO2 (%)     -- --       Physical Exam    ED Course & MDM   ED Course as of 03/20/24 2113   Wed Mar 20, 2024   2105 On exam patient is comfortable, high BMI exam limited right extremity has no obvious deformity has varicose veins of bounding pulses neurovascular intact is tender palpation over the lateral calf area.  Otherwise calf is supple no palpable cords static negative lower extremities bilaterally.  Neurovascular intact no signs of any neurovascular compromise ultrasound no DVT patient to be discharged with supportive care close outpatient follow-up with strict return precautions. [MT]      ED Course User Index  [MT] Avila Gonzalez MD         Diagnoses as of 03/20/24 2113   Musculoskeletal pain of lower extremity, right       Medical Decision Making      Procedure  Procedures     Avila Gonzalez MD  03/20/24 2116

## 2024-04-09 ENCOUNTER — APPOINTMENT (OUTPATIENT)
Dept: PRIMARY CARE | Facility: CLINIC | Age: 39
End: 2024-04-09
Payer: COMMERCIAL

## 2024-04-23 ENCOUNTER — OFFICE VISIT (OUTPATIENT)
Dept: PRIMARY CARE | Facility: CLINIC | Age: 39
End: 2024-04-23
Payer: COMMERCIAL

## 2024-04-23 ENCOUNTER — LAB (OUTPATIENT)
Dept: LAB | Facility: LAB | Age: 39
End: 2024-04-23
Payer: COMMERCIAL

## 2024-04-23 VITALS
DIASTOLIC BLOOD PRESSURE: 72 MMHG | BODY MASS INDEX: 36.45 KG/M2 | SYSTOLIC BLOOD PRESSURE: 118 MMHG | WEIGHT: 315 LBS | OXYGEN SATURATION: 96 % | HEIGHT: 78 IN | TEMPERATURE: 97 F | HEART RATE: 77 BPM | RESPIRATION RATE: 15 BRPM

## 2024-04-23 DIAGNOSIS — R29.898 WEAKNESS OF RIGHT LOWER EXTREMITY: ICD-10-CM

## 2024-04-23 DIAGNOSIS — E03.8 SUBCLINICAL HYPOTHYROIDISM: ICD-10-CM

## 2024-04-23 DIAGNOSIS — M54.42 CHRONIC BILATERAL LOW BACK PAIN WITH BILATERAL SCIATICA: ICD-10-CM

## 2024-04-23 DIAGNOSIS — E11.9 TYPE 2 DIABETES MELLITUS WITHOUT COMPLICATION, WITHOUT LONG-TERM CURRENT USE OF INSULIN (MULTI): ICD-10-CM

## 2024-04-23 DIAGNOSIS — J45.20 MILD INTERMITTENT ASTHMA, UNSPECIFIED WHETHER COMPLICATED (HHS-HCC): ICD-10-CM

## 2024-04-23 DIAGNOSIS — D64.9 ANEMIA, UNSPECIFIED TYPE: ICD-10-CM

## 2024-04-23 DIAGNOSIS — M54.41 CHRONIC BILATERAL LOW BACK PAIN WITH BILATERAL SCIATICA: ICD-10-CM

## 2024-04-23 DIAGNOSIS — E11.9 TYPE 2 DIABETES MELLITUS WITHOUT COMPLICATION, WITHOUT LONG-TERM CURRENT USE OF INSULIN (MULTI): Primary | ICD-10-CM

## 2024-04-23 DIAGNOSIS — G89.29 CHRONIC BILATERAL LOW BACK PAIN WITH BILATERAL SCIATICA: ICD-10-CM

## 2024-04-23 PROBLEM — R73.09 ELEVATED HEMOGLOBIN A1C: Status: RESOLVED | Noted: 2024-04-23 | Resolved: 2024-04-23

## 2024-04-23 PROBLEM — I83.92 VARICOSE VEINS OF LEFT LOWER EXTREMITY: Status: ACTIVE | Noted: 2024-04-23

## 2024-04-23 PROBLEM — R73.09 ELEVATED HEMOGLOBIN A1C: Status: ACTIVE | Noted: 2024-04-23

## 2024-04-23 PROBLEM — D50.9 IRON DEFICIENCY ANEMIA: Status: ACTIVE | Noted: 2024-04-23

## 2024-04-23 PROBLEM — R06.83 SNORING: Status: ACTIVE | Noted: 2024-04-23

## 2024-04-23 LAB
ALBUMIN SERPL BCP-MCNC: 3.9 G/DL (ref 3.4–5)
ALP SERPL-CCNC: 69 U/L (ref 33–120)
ALT SERPL W P-5'-P-CCNC: 24 U/L (ref 10–52)
ANION GAP SERPL CALC-SCNC: 15 MMOL/L (ref 10–20)
AST SERPL W P-5'-P-CCNC: 15 U/L (ref 9–39)
BASOPHILS # BLD AUTO: 0.04 X10*3/UL (ref 0–0.1)
BASOPHILS NFR BLD AUTO: 0.4 %
BILIRUB SERPL-MCNC: 0.6 MG/DL (ref 0–1.2)
BUN SERPL-MCNC: 12 MG/DL (ref 6–23)
CALCIUM SERPL-MCNC: 9.4 MG/DL (ref 8.6–10.6)
CHLORIDE SERPL-SCNC: 103 MMOL/L (ref 98–107)
CHOLEST SERPL-MCNC: 162 MG/DL (ref 0–199)
CHOLESTEROL/HDL RATIO: 2.5
CO2 SERPL-SCNC: 29 MMOL/L (ref 21–32)
CREAT SERPL-MCNC: 1.06 MG/DL (ref 0.5–1.3)
EGFRCR SERPLBLD CKD-EPI 2021: 90 ML/MIN/1.73M*2
EOSINOPHIL # BLD AUTO: 0.01 X10*3/UL (ref 0–0.7)
EOSINOPHIL NFR BLD AUTO: 0.1 %
ERYTHROCYTE [DISTWIDTH] IN BLOOD BY AUTOMATED COUNT: 14.9 % (ref 11.5–14.5)
EST. AVERAGE GLUCOSE BLD GHB EST-MCNC: 137 MG/DL
FERRITIN SERPL-MCNC: 255 NG/ML (ref 20–300)
FOLATE SERPL-MCNC: 8.1 NG/ML
GLUCOSE SERPL-MCNC: 92 MG/DL (ref 74–99)
HBA1C MFR BLD: 6.4 %
HCT VFR BLD AUTO: 43.7 % (ref 41–52)
HDLC SERPL-MCNC: 63.9 MG/DL
HGB BLD-MCNC: 13.8 G/DL (ref 13.5–17.5)
HGB RETIC QN: 32 PG (ref 28–38)
IMM GRANULOCYTES # BLD AUTO: 0.05 X10*3/UL (ref 0–0.7)
IMM GRANULOCYTES NFR BLD AUTO: 0.5 % (ref 0–0.9)
IMMATURE RETIC FRACTION: 11.8 %
IRON SATN MFR SERPL: 26 % (ref 25–45)
IRON SERPL-MCNC: 70 UG/DL (ref 35–150)
LDLC SERPL CALC-MCNC: 61 MG/DL
LYMPHOCYTES # BLD AUTO: 3.33 X10*3/UL (ref 1.2–4.8)
LYMPHOCYTES NFR BLD AUTO: 32.7 %
MCH RBC QN AUTO: 29.1 PG (ref 26–34)
MCHC RBC AUTO-ENTMCNC: 31.6 G/DL (ref 32–36)
MCV RBC AUTO: 92 FL (ref 80–100)
MONOCYTES # BLD AUTO: 0.72 X10*3/UL (ref 0.1–1)
MONOCYTES NFR BLD AUTO: 7.1 %
NEUTROPHILS # BLD AUTO: 6.03 X10*3/UL (ref 1.2–7.7)
NEUTROPHILS NFR BLD AUTO: 59.2 %
NON HDL CHOLESTEROL: 98 MG/DL (ref 0–149)
NRBC BLD-RTO: 0 /100 WBCS (ref 0–0)
PLATELET # BLD AUTO: 214 X10*3/UL (ref 150–450)
POTASSIUM SERPL-SCNC: 4.7 MMOL/L (ref 3.5–5.3)
PROT SERPL-MCNC: 7 G/DL (ref 6.4–8.2)
RBC # BLD AUTO: 4.74 X10*6/UL (ref 4.5–5.9)
RETICS #: 0.1 X10*6/UL (ref 0.02–0.12)
RETICS/RBC NFR AUTO: 2.1 % (ref 0.5–2)
SODIUM SERPL-SCNC: 142 MMOL/L (ref 136–145)
T4 FREE SERPL-MCNC: 1.15 NG/DL (ref 0.78–1.48)
TIBC SERPL-MCNC: 271 UG/DL (ref 240–445)
TRIGL SERPL-MCNC: 184 MG/DL (ref 0–149)
TSH SERPL-ACNC: 7.81 MIU/L (ref 0.44–3.98)
UIBC SERPL-MCNC: 201 UG/DL (ref 110–370)
VIT B12 SERPL-MCNC: 512 PG/ML (ref 211–911)
VLDL: 37 MG/DL (ref 0–40)
WBC # BLD AUTO: 10.2 X10*3/UL (ref 4.4–11.3)

## 2024-04-23 PROCEDURE — 3008F BODY MASS INDEX DOCD: CPT | Performed by: FAMILY MEDICINE

## 2024-04-23 PROCEDURE — 83036 HEMOGLOBIN GLYCOSYLATED A1C: CPT

## 2024-04-23 PROCEDURE — 82728 ASSAY OF FERRITIN: CPT

## 2024-04-23 PROCEDURE — 85045 AUTOMATED RETICULOCYTE COUNT: CPT

## 2024-04-23 PROCEDURE — 84439 ASSAY OF FREE THYROXINE: CPT

## 2024-04-23 PROCEDURE — 3078F DIAST BP <80 MM HG: CPT | Performed by: FAMILY MEDICINE

## 2024-04-23 PROCEDURE — 36415 COLL VENOUS BLD VENIPUNCTURE: CPT

## 2024-04-23 PROCEDURE — 83540 ASSAY OF IRON: CPT

## 2024-04-23 PROCEDURE — 85025 COMPLETE CBC W/AUTO DIFF WBC: CPT

## 2024-04-23 PROCEDURE — 83550 IRON BINDING TEST: CPT

## 2024-04-23 PROCEDURE — 82607 VITAMIN B-12: CPT

## 2024-04-23 PROCEDURE — 82746 ASSAY OF FOLIC ACID SERUM: CPT

## 2024-04-23 PROCEDURE — 99214 OFFICE O/P EST MOD 30 MIN: CPT | Performed by: FAMILY MEDICINE

## 2024-04-23 PROCEDURE — 80061 LIPID PANEL: CPT

## 2024-04-23 PROCEDURE — 84443 ASSAY THYROID STIM HORMONE: CPT

## 2024-04-23 PROCEDURE — 80053 COMPREHEN METABOLIC PANEL: CPT

## 2024-04-23 PROCEDURE — 3074F SYST BP LT 130 MM HG: CPT | Performed by: FAMILY MEDICINE

## 2024-04-23 PROCEDURE — 1036F TOBACCO NON-USER: CPT | Performed by: FAMILY MEDICINE

## 2024-04-23 RX ORDER — FLUTICASONE PROPIONATE AND SALMETEROL XINAFOATE 115; 21 UG/1; UG/1
AEROSOL, METERED RESPIRATORY (INHALATION)
COMMUNITY
Start: 2022-01-25 | End: 2024-04-23 | Stop reason: WASHOUT

## 2024-04-23 RX ORDER — GABAPENTIN 100 MG/1
100 CAPSULE ORAL NIGHTLY
Start: 2024-04-23 | End: 2024-07-22

## 2024-04-23 RX ORDER — ALBUTEROL SULFATE 90 UG/1
2 AEROSOL, METERED RESPIRATORY (INHALATION) EVERY 4 HOURS PRN
Qty: 8.5 G | Refills: 1 | Status: SHIPPED | OUTPATIENT
Start: 2024-04-23 | End: 2025-04-23

## 2024-04-23 RX ORDER — GLYCOPYRROLATE AND FORMOTEROL FUMARATE 9; 4.8 UG/1; UG/1
AEROSOL, METERED RESPIRATORY (INHALATION) 2 TIMES DAILY
COMMUNITY

## 2024-04-23 ASSESSMENT — PATIENT HEALTH QUESTIONNAIRE - PHQ9
SUM OF ALL RESPONSES TO PHQ9 QUESTIONS 1 AND 2: 0
2. FEELING DOWN, DEPRESSED OR HOPELESS: NOT AT ALL
1. LITTLE INTEREST OR PLEASURE IN DOING THINGS: NOT AT ALL

## 2024-04-23 NOTE — PROGRESS NOTES
"Subjective   Patient ID: Bert Abraham is a 39 y.o. male who presents for Follow-up (Pt is here following up for sciatica).    HPI     Back pain started 5+yrs ago- worsened over that month  No trauma to the area  Was with xray on 3/21/24 showed multilevel dsc dz and facet arthrosis  Using prednisone taper which is help some  Numbness in the toes  great and second toe  When driving the car pressing in the center consol   Also with pain int eh thigh  with radition down the leg ( with tib/fib xray that was nml)  Calf pain - felt like charley horse for 26 hrs- went to the ER for this- US was neg for DVT  Upcoming appt with vascular med  Used prednisone for it- which is helping with this as well;    Denies loss of control of bowel or bladder, saddle paresthesia, and   + leg weakness  in the calf- but this is better  + leg paresthesia    Hx of dm  Was on ozemic with wt loss  Needs PA- requesting reorder      Review of Systems  All systems reviewed and neg if not noted in the HPI above       Objective   /72 (Patient Position: Sitting)   Pulse 77   Temp 36.1 °C (97 °F)   Resp 15   Ht 1.981 m (6' 6\")   Wt (!) 214 kg (471 lb 4.8 oz)   SpO2 96%   BMI 54.46 kg/m²     Physical Exam    Patient appears to be in mild to moderate pain, non-antalgic gait noted.   paraspinal muscles TTP at L5-S1  No masses.  Painful and reduced LS ROM noted.   Straight leg raise is neg   DTR's,   4/5 foot/toe strength on the right, unable to heel walk on the right  sensation normal  Nml toe gait.    Peripheral pulses are palpable.       Assessment/Plan   Problem List Items Addressed This Visit             ICD-10-CM    Absolute anemia D64.9    Relevant Orders    Iron and TIBC    Ferritin    Vitamin B12    Folate    Reticulocytes    Asthma ([facility]) J45.909     Using inhaler  Doing well         Relevant Medications    albuterol (ProAir HFA) 90 mcg/actuation inhaler    Other Relevant Orders    CBC and Auto Differential    Comprehensive " Metabolic Panel    Low back pain  - PT ordered  - continue the prednisone  - let me know if you would like pain management  - can use tyrone- start with 100mg at night, increase every 3days if needed  - MRI ordered due to weakness  Topical creams- Voltaren gel, Salonpas, Icy hot, Bio freeze, Capsaicin, Asper cream, or Tiger balm (these are all over the counter)   \ M54.50    Relevant Medications    gabapentin (Neurontin) 100 mg capsule    Other Relevant Orders    Referral to Physical Therapy    MR lumbar spine wo IV contrast    Subclinical hypothyroidism E03.8    Relevant Orders    CBC and Auto Differential    Comprehensive Metabolic Panel    Lipid Panel    TSH with reflex to Free T4 if abnormal     Other Visit Diagnoses         Codes    Type 2 diabetes mellitus without complication, without long-term current use of insulin (Multi)    -  Primary E11.9    Relevant Medications    semaglutide 0.25 mg or 0.5 mg (2 mg/3 mL) pen injector (Start on 4/28/2024)    Other Relevant Orders    CBC and Auto Differential    Comprehensive Metabolic Panel    Hemoglobin A1C    Lipid Panel    TSH with reflex to Free T4 if abnormal    Weakness of right lower extremity     R29.898    Relevant Orders    MR lumbar spine wo IV contrast            Please follow up in 6months for wellness  or as needed.

## 2024-04-23 NOTE — PATIENT INSTRUCTIONS
Back pain  - PT ordered  - continue the prednisone  - let me know if you would like pain management  - can use tyrone- start with 100mg at night, increase every 3days if needed  - MRI ordered due to weakness  Topical creams- Voltaren gel, Salonpas, Icy hot, Bio freeze, Capsaicin, Asper cream, or Tiger balm (these are all over the counter)       Anemia  - rechecking labs    Restarting the ozempic    Please follow up in 6months for wellness  or as needed.       ** If labs or imaging ordered at today's visit, all the non-urgent results will be discussed at your next visit    If you have been referred for a special test or to a specialist please call  [phone] to schedule an appointment.  If you have any further questions, or if develop new or worsened symptoms, please give our office a call at [phone].

## 2024-04-24 DIAGNOSIS — E03.8 SUBCLINICAL HYPOTHYROIDISM: Primary | ICD-10-CM

## 2024-05-03 ENCOUNTER — TELEPHONE (OUTPATIENT)
Dept: PRIMARY CARE | Facility: CLINIC | Age: 39
End: 2024-05-03

## 2024-05-03 NOTE — TELEPHONE ENCOUNTER
Maria Del Carmen from  [employer] stated the lumbar spine MRI was denied by his insurance, [employer], because he did not have 6 weeks of PT. Please, call [phone], the physician review team for peer to peer. Case # is [id number]. MRI is scheduled for 5/11/24.

## 2024-05-06 ENCOUNTER — OFFICE VISIT (OUTPATIENT)
Dept: SLEEP MEDICINE | Facility: CLINIC | Age: 39
End: 2024-05-06
Payer: COMMERCIAL

## 2024-05-06 VITALS
SYSTOLIC BLOOD PRESSURE: 122 MMHG | TEMPERATURE: 98.2 F | DIASTOLIC BLOOD PRESSURE: 77 MMHG | WEIGHT: 315 LBS | BODY MASS INDEX: 36.45 KG/M2 | OXYGEN SATURATION: 96 % | HEART RATE: 88 BPM | HEIGHT: 78 IN

## 2024-05-06 DIAGNOSIS — G47.33 OBSTRUCTIVE SLEEP APNEA: Primary | ICD-10-CM

## 2024-05-06 DIAGNOSIS — E66.01 CLASS 3 SEVERE OBESITY WITH BODY MASS INDEX (BMI) OF 50.0 TO 59.9 IN ADULT, UNSPECIFIED OBESITY TYPE, UNSPECIFIED WHETHER SERIOUS COMORBIDITY PRESENT (MULTI): ICD-10-CM

## 2024-05-06 PROBLEM — E66.813 CLASS 3 SEVERE OBESITY WITH BODY MASS INDEX (BMI) OF 50.0 TO 59.9 IN ADULT: Status: ACTIVE | Noted: 2024-05-06

## 2024-05-06 PROCEDURE — 3008F BODY MASS INDEX DOCD: CPT | Performed by: PHYSICIAN ASSISTANT

## 2024-05-06 PROCEDURE — 99203 OFFICE O/P NEW LOW 30 MIN: CPT | Performed by: PHYSICIAN ASSISTANT

## 2024-05-06 PROCEDURE — 1036F TOBACCO NON-USER: CPT | Performed by: PHYSICIAN ASSISTANT

## 2024-05-06 NOTE — PROGRESS NOTES
Patient: Bert Abraham    [id number]  : 1985 -- AGE 39 y.o.    Provider: Sherri Levine PA-C     Location [facility]   Service Date: 2024              [facility]  New Visit Note      HISTORY OF PRESENT ILLNESS     The patient's referring provider is: No ref. provider found; Antonieta Monaco, DO    HISTORY OF PRESENT ILLNESS   Bert Abraham is a 39 y.o. male with h/o asthma, snoring, fatigue, GERD, iron deficiency anemia,  who presents to a [facility] for a sleep medicine evaluation with concerns of New Patient Visit (New patient visit following sleep study done in March).     Past Sleep History  Diagnostic PSG 3/2023    RDI3%: 21.7 (REM RDI 3%- 55.6)  RDI4%: 10.6  O2 christian: 90%     Current History    On today's visit, the patient reports here for follow up after a diagnostic PSG. Reports snoring and daytime sleepiness and issues with sleep maintenance were the main reasons that prompted testing. States had a gasping for air in middle of the night within the past two months, this otherwise has not been noted. Does experience sometimes dry mouth in the morning. Denies issue working or driving related to sleepiness. This was patient's first sleep study. He is interested in using a cpap.     Denies any RLS, parasomnias, insomnia or other sleep concerns.     Sleep schedule:  *Works as a  -denies sleepiness impacting his work   In bed: 11:45P  Subjective sleep latency:  10 minutes  Awakenings during night:  2- nocturia   Length of awakenings:  occasionally has a hard time getting back to sleep if earlier in the morning   Final awakening time:  7:45 hours  Naps: 1PM x2 hours around 1PM- this is refreshing     Overall estimate of total sleep time: 7 hours   Weekends/Days off: same     Preferred sleeping position: SLEEP POSITION: prone        ESS:  6  HUBERT:  7  FOSQ: 31      REVIEW OF SYSTEMS     REVIEW OF  SYSTEMS  See HPI; all other ROS were reviewed and negative for compliant        ALLERGIES AND MEDICATIONS     ALLERGIES  Allergies   Allergen Reactions    Acetaminophen Unknown    Phenobarbital Hives and Itching       MEDICATIONS  Current Outpatient Medications   Medication Sig Dispense Refill    albuterol (ProAir HFA) 90 mcg/actuation inhaler Inhale 2 puffs every 4 hours if needed for wheezing or shortness of breath. 8.5 g 1    celecoxib (CeleBREX) 200 mg capsule Take 1 capsule (200 mg) by mouth once daily. 90 capsule 1    cholecalciferol (Vitamin D-3) 50 mcg (2,000 unit) capsule Take 1 capsule (50 mcg) by mouth once daily.      desvenlafaxine 50 mg 24 hr tablet       diclofenac sodium 1 % kit Place on the skin.      gabapentin (Neurontin) 100 mg capsule Take 1 capsule (100 mg) by mouth once daily at bedtime.      glycopyrrolate-formoteroL (Bevespi Aerosphere) 9-4.8 mcg HFA aerosol inhaler Inhale 2 times a day.      metFORMIN  mg 24 hr tablet TAKE 1 TABLET DAILY AS     DIRECTED 90 tablet 2    semaglutide 0.25 mg or 0.5 mg (2 mg/3 mL) pen injector Inject 0.25 mg under the skin 1 (one) time per week. 3 mL 0     No current facility-administered medications for this visit.         PAST HISTORY     PAST MEDICAL HISTORY  He  has a past medical history of Crushing injury of right great toe, sequela (10/10/2016), Other chronic diseases of tonsils and adenoids (07/13/2016), Pain in left ankle and joints of left foot (03/01/2017), Pain in left leg (03/01/2017), Pain in left shoulder (03/01/2017), Personal history of other specified conditions (07/13/2016), Personal history of other specified conditions (07/13/2016), and Sprain of ligaments of lumbar spine, initial encounter (07/13/2016).    PAST SURGICAL HISTORY:  No past surgical history on file.    FAMILY HISTORY  No family history on file.    He does not have a family history of sleep disorder that has been diagnosed. States grandma and mom snore    SOCIAL  "HISTORY  He  reports that he has never smoked. He has never used smokeless tobacco. He reports that he does not drink alcohol and does not use drugs. He    Caffeine consumption: Yes, 2 cups/day  Alcohol consumption: Yes, rarely (occasional)  Marijuana: No      PHYSICAL EXAM     VITAL SIGNS: /77 (BP Location: Right arm, Patient Position: Sitting, BP Cuff Size: Large adult)   Pulse 88   Temp 36.8 °C (98.2 °F) (Temporal)   Ht 1.981 m (6' 6\")   Wt (!) 208 kg (458 lb)   SpO2 96%   BMI 52.93 kg/m²      CURRENT WEIGHT:   Vitals:    05/06/24 1146   Weight: (!) 208 kg (458 lb)     Body mass index is 52.93 kg/m².  PREVIOUS WEIGHTS:  Wt Readings from Last 3 Encounters:   05/06/24 (!) 208 kg (458 lb)   04/23/24 (!) 214 kg (471 lb 4.8 oz)   03/20/24 (!) 209 kg (460 lb)       Constitutional: Alert and oriented, cooperative, no acute distress  Head: Normocephalic, atraumatic   Cranial Features: No abnormal craniofacial features  Neck: Supple. Trachea midline.  Pulmonary: Non-labored breathing, speaks in full sentences.   Cardiac: regular rate   Extremities: No clubbing, no edema  Neuromuscular: Cranial nerves grossly intact, no focal deficits      RESULTS/DATA     Bicarbonate (mmol/L)   Date Value   04/23/2024 29   11/13/2023 28   05/01/2023 33 (H)   04/04/2022 27   01/07/2022 34 (H)     Iron (ug/dL)   Date Value   04/23/2024 70   11/13/2023 71     % Saturation (%)   Date Value   04/23/2024 26   11/13/2023 25     TIBC (ug/dL)   Date Value   04/23/2024 271   11/13/2023 289     Ferritin (ng/mL)   Date Value   04/23/2024 255             ASSESSMENT/PLAN     Mr. Abraham is a 39 y.o. male and He was referred to the [facility] for evaluation of CORINNE    Problem List, Orders, Assessment, Recommendations:  Problem List Items Addressed This Visit             ICD-10-CM    Obstructive sleep apnea - Primary G47.33     -mild-moderate CORINNE on diagnostic PSG; Referred by PCP for classic CORINNE " symptoms  -discussed treatment options, would like to start pap therapy; autopap ordered with [facility]  -encourage healthy diet/exercise  -avoid drowsy driving         Relevant Orders    Follow Up In Adult Sleep Medicine    Positive Airway Pressure (PAP) Therapy    Class 3 severe obesity with body mass index (BMI) of 50.0 to 59.9 in adult (Multi) E66.01, Z68.43     -bicarb 29 - 4/2024  -exercise/healthy diet/weight loss encouraged            Disposition    Return to clinic in 3-4 months

## 2024-05-06 NOTE — ASSESSMENT & PLAN NOTE
-mild-moderate CORINNE on diagnostic PSG; Referred by PCP for classic CORINNE symptoms  -discussed treatment options, would like to start pap therapy; autopap ordered with [facility]  -encourage healthy diet/exercise  -avoid drowsy driving

## 2024-05-06 NOTE — PATIENT INSTRUCTIONS
[facility] Sleep Medicine  DO [address]  [address] [address]       NAME: Bert Abraham   DATE: 05/06/24    Your Sleep Provider Today: Sherri Levine PA-C  Your Primary Care Physician: Antonieta Monaco, DO   Your Referring Provider: No ref. provider found    DIAGNOSIS:   No diagnosis found.    Thank you for coming to the Sleep Medicine Clinic today! Your sleep medicine provider today was: Sherri Levine PA-C Below is a summary of your treatment plan, other important information, and our contact numbers:      TREATMENT PLAN   FOR QUESTIONS AND CONCERNS:   1. In case of difficulties with PAP device or mask interface, please FIRST contact your DME        (If using [employer]: [phone])  2. For questions concerning SLEEP CLINIC appointments, please call [phone].  3. Regarding SLEEP LAB scheduling, please call [phone] or [phone]    Obstructive Sleep Apnea (CORINNE) is a disorder characterized by recurrent apneas during sleep despite efforts to breath. It is due to upper airway obstruction. These respiratory pauses may induce high levels of carbon dioxide or low oxygen levels. Cardiac arrhythmia and elevation of blood pressure in the body and the lungs may occur. Frequent partial arousals occur during a nights sleep resulting in sleep deprivation and daytime sleepiness. It has been associated with an increased risk of cardiovascular disease, stroke, hypertension, and insulin resistance.    RECOMMENDATIONS to help your sleep apnea include: losing weight if overweight, avoidance of sleeping on your back, avoidance of alcohol 2-3 hours before be      As we discussed, you have sleep apnea. We will order an CPAP for you which will set you up with your new PAP at home. This will be done by a [employer] ([employer]).    **Bring all equipment with you to follow-up appointments.**     **You will need to be seen day 31-90  "days after CPAP set up.**    Insurance expects 4+ hours of use at least 70% of the time.         *Additional Tips*  *You can look at CPAP.com to view different mask styles  - You may be able to swap masks with your PAP vendor within 30 days without being charged for a new mask from the time you receive your PAP device. You should take the advantage of this offer if you have a hard time finding the right mask, as there are many mask options. Please do not get discouraged if you do not like the first one!  - You must clean your PAP device regularly per instructions from your PAP vendor.    *Common Issues and Solutions*  Pillow is dislodging mask - Consider getting a CPAP pillow or switching to a mask with the hose at the top.    Dry Mouth - Adjust Humidity and/or try Biotene Gel.    Leak - Please call your equipment provider (i.e. [employer]) as they may need to adjust your mask.    Difficulty tolerating the PAP mask - Contact your equipment company to try a different mask, we can order a \"mini sleep study\" with the sleep tech to try different masks/settings of pressure.      Additional Resources: American Academy of Sleep Medicine http://sleepeducation.org; or National Sleep Foundation: https://sleepfoundation.org      Instructions - Common CORINNE Recs: - For your sleep apnea, continue to use your PAP every night and use it whenever you are sleeping.   - Avoid alcohol or sedatives several hours prior to sleeping.   - Get additional supplies for your PAP (e.g., mask, hose, filters) every 3 months or as your insurance allows from your DME company. Replacement cushions for your PAP mask can be requested monthly if airseals are an issue.  - Remember to clean your mask, tubings, and water chamber regularly as instructed.  - Avoid driving or operating heavy machinery when drowsy. A person driving while sleepy is five (5) times more likely to have an accident. If you feel sleepy, pull over and take a short power " nap (sleep for less than 30 minutes). Otherwise, ask somebody to drive you.        Follow-up Appointment:   3-4 months      IMPORTANT INFORMATION     Call 911 for medical emergencies.  Our offices are generally open from Monday-Friday, 9 am - 5 pm.  If you need to get in touch with me, you may either call me and my team(number is below) or you can use [employer].  If a referral for a test, for CPAP, or for another specialist was made, and you have not heard about scheduling this within a week, please call scheduling at [phone] ([phone]).  If you are unable to make your appointment for clinic or an overnight study, kindly call the office at least 48 hours in advance to cancel and reschedule.  If you are on CPAP, please bring your device's card or the device to each clinic appointment.   There are no supporting services by either the sleep doctors or their staff on weekends and Holidays, or after 5 PM on weekdays.   If you have been asked to come to a sleep study, make sure you bring toiletries, a comfy pillow, and any nighttime medications that you may regularly take. Also be sure to eat dinner before you arrive. We generally do not provide meals.      PRESCRIPTIONS     We require 7 days advanced notice for prescription refills. If we do not receive the request in this time, we cannot guarantee that your medication will be refilled in time.      IMPORTANT PHONE NUMBERS     Sleep Medicine Clinic Fax: [phone]  Appointments (for Adult Sleep Clinic): [phone] - option 2  Appointments (For Sleep Studies): [phone] - option 3  Behavioral Sleep Medicine: [phone]  Sleep Surgery: [phone]  ENT (Otolaryngology): [phone]  Headache Clinic (Neurology): [phone]  Neurology: [phone]  Psychiatry: [phone]  Pulmonary Function Testing (PFT) Center: [phone]  Pulmonary Medicine: [phone]  [employer] (DME): [phone]  [employer] (DME):  [phone]  [facility] (DME): [phone]      OUR ADULT SLEEP MEDICINE TEAM   Please do not hesitate to call the office or sleep nurse with any questions between appointments:    Adult Sleep Nurses (Milli Venegas, RN and Nevaeh Alexis, RN):  For clinical questions and refilling prescriptions: [phone]  Email sleep diaries and other documents at: [email or web address]    Adult Sleep Medicine Secretaries:  Harini Pan (For Elle/[place]/Krise/[place]ro[place]/Yeh/Pate):   P: [phone]  F: [phone]  Peace Granger (For [place]/Tracey): P: [phone]  Fax: [phone]  Ashley Salgado (For [place]/Blank): P: [phone]  F: [phone]  Zoraida Balderas (For [place]): P: [phone]  F: [phone]  Sherly Cheng (For Elisabeth/Elier/Zakhary): P: [phone]  F: [phone]  Linda Danielson (For P[place]/Turner): P: [phone]  F: [phone]     Adult Sleep Medicine Advanced Practice Providers:  Lokesh Zapata ([place], [place])  Cady Thapa ([facility])  Tracy Webb CNP ([place], [place], [place])  Maine Levine CNP ([place], [place], [place])  Queta Serrano ([place], [place], [place])  Tawana Turner CNP ([facility])        OUR SLEEP TESTING LOCATIONS     Our team will contact you to schedule your sleep study, however, you can contact us as follow:  Main Phone Line (scheduling only): [phone]), option 3  Adult and Pediatric Locations  [facility] (6 years and older): [facility] - 4th floor ([address] [place] [address]) After hours line: [phone]  [facility] (Main campus: All ages): [place], 6th floor. After hours line: [phone]   [place] (5 years and older; younger considered on case-by-case basis): [address]; [address], [address]. Scheduling  After hours line: [phone]   [place] (6 years and older): [address]; Medical [address]  [address]; [address]   [place] (6 years and older): [address], [address]  After hours line: [phone]   [place] (13 years and older) in [place]: [address], 2nd floor  After hours line: [phone]   [place] (13 year and older): [address]  After hours line: [phone]     Adult Only Locations:   [place] (18 years and older): [address] [place] [address], 2nd floor   [place] (18 years and older): [address] [facility]; 4th floor  After hours line: [phone]   [place] (18 years and older) at [place]: [address] [place] [address]  After hours line: [phone]          CONTACTING YOUR SLEEP MEDICINE PROVIDER     Send a message directly to your provider through \"My Chart\", which is the email service through your  Records Account: [email or web address]   Call [phone] and leave a message. One of the administrative assistants will forward the message to your sleep medicine provider through \"My Chart\" and/or email.     Your sleep medicine provider for this visit was: Sherri Levine PA-C    "

## 2024-05-09 ENCOUNTER — PATIENT MESSAGE (OUTPATIENT)
Dept: PRIMARY CARE | Facility: CLINIC | Age: 39
End: 2024-05-09
Payer: COMMERCIAL

## 2024-05-09 NOTE — TELEPHONE ENCOUNTER
Jes called again regarding this issue. She said the pt is scheduled for 5/11/24 and a peer to peer needs to be performed. An email and inbasket message was sent to you per her words.

## 2024-05-10 ENCOUNTER — PATIENT MESSAGE (OUTPATIENT)
Dept: PRIMARY CARE | Facility: CLINIC | Age: 39
End: 2024-05-10
Payer: COMMERCIAL

## 2024-05-10 NOTE — TELEPHONE ENCOUNTER
Will need PT prior to the mri ( tracy since the weakness is better)  I sent [facility] message

## 2024-05-10 NOTE — TELEPHONE ENCOUNTER
I called  No answer      Pls call him  Ask if completed PT in the past for his back pain  Insurance wants 6wks of pt first prior to the MRI

## 2024-05-11 ENCOUNTER — APPOINTMENT (OUTPATIENT)
Dept: RADIOLOGY | Facility: HOSPITAL | Age: 39
End: 2024-05-11
Payer: COMMERCIAL

## 2024-05-13 NOTE — TELEPHONE ENCOUNTER
From: Bert Abraham  To: Antonieta Monaco  Sent: 5/9/2024 4:44 PM EDT  Subject: MRI Authorization    Good afternoon,    I received the denial letter for my MRI authorization and was wondering if I needed to do anything to follow up with that. Some additional information worth providing:    * I've been on pain medication for my back for more than a year and it is not improving  * I've seen an orthopedics specialist  * The weakness in my leg is worsening. Going down stairs has been difficult for more than a year and going up stairs has been getting more difficult in the past month  * I have physical therapy scheduled of course.    If I need to do something to help the appeals process let me know.    Thank you.

## 2024-05-23 ENCOUNTER — APPOINTMENT (OUTPATIENT)
Dept: RADIOLOGY | Facility: HOSPITAL | Age: 39
End: 2024-05-23
Payer: COMMERCIAL

## 2024-05-29 ENCOUNTER — EVALUATION (OUTPATIENT)
Dept: PHYSICAL THERAPY | Facility: CLINIC | Age: 39
End: 2024-05-29
Payer: COMMERCIAL

## 2024-05-29 DIAGNOSIS — M54.41 CHRONIC BILATERAL LOW BACK PAIN WITH BILATERAL SCIATICA: ICD-10-CM

## 2024-05-29 DIAGNOSIS — M62.81 MUSCLE WEAKNESS: Primary | ICD-10-CM

## 2024-05-29 DIAGNOSIS — M54.42 CHRONIC BILATERAL LOW BACK PAIN WITH BILATERAL SCIATICA: ICD-10-CM

## 2024-05-29 DIAGNOSIS — R27.9 LACK OF COORDINATION: ICD-10-CM

## 2024-05-29 DIAGNOSIS — G89.29 CHRONIC BILATERAL LOW BACK PAIN WITH BILATERAL SCIATICA: ICD-10-CM

## 2024-05-29 PROCEDURE — 97110 THERAPEUTIC EXERCISES: CPT | Mod: GP

## 2024-05-29 PROCEDURE — 97161 PT EVAL LOW COMPLEX 20 MIN: CPT | Mod: GP

## 2024-05-29 ASSESSMENT — PAIN - FUNCTIONAL ASSESSMENT: PAIN_FUNCTIONAL_ASSESSMENT: 0-10

## 2024-05-29 ASSESSMENT — ENCOUNTER SYMPTOMS
OCCASIONAL FEELINGS OF UNSTEADINESS: 0
LOSS OF SENSATION IN FEET: 1
DEPRESSION: 0

## 2024-05-29 ASSESSMENT — PAIN SCALES - GENERAL: PAINLEVEL_OUTOF10: 0 - NO PAIN

## 2024-05-29 NOTE — PROGRESS NOTES
Physical Therapy  Physical Therapy Orthopedic Evaluation    Patient Name: Bert Abraham  MRN: [id number]  Today's Date: 5/29/2024  Time Calculation  Start Time: 1314  Stop Time: 1359  Time Calculation (min): 45 min    Insurance:  Visit number: 1 of 20  Authorization info: NO  Insurance Type: MMO    General:  Reason for visit: Chronic bilateral low back pain with bilateral sciatica   Referred by: Antonieta Monaco DO    Current Problem:  1. Muscle weakness        2. Chronic bilateral low back pain with bilateral sciatica  Referral to Physical Therapy      3. Lack of coordination            Precautions: check in on bladder function  Precautions  STEADI Fall Risk Score (The score of 4 or more indicates an increased risk of falling): 3      Medical History Form: Reviewed (scanned into chart)    Subjective:   2 months ago noticed calf cramp on R that lasted over 24 hours and has had numbness and weakness since then; LBP for multiple years.  Prednisone is the only thing that helped both.  Still has weakness and numbness on R - numbness from big toe and up posterior lateral shin  Chief Complaint: Patient presents to clinic low back pain with R sciatica and bilateral weakness.  Onset Date: 5 years ago; recent flare up 3 months  NAEL: Chronic    Current Condition:   Same    Pain:  Pain Assessment: 0-10  Pain Score: 0 - No pain  Pain Location: Back  Pain Orientation: Right, Posterior  Pain Radiating Towards: calf  Location: low back pain at L5-S1 and down into calf and toes with numbness  Description: numbness and tingling- tight, back= sharp  Aggravating Factors: lifting, carrying, walking, stairs, forward bending  Relieving Factors:  prednisone, lidocaine    Relevant Information (PMH & Previous Tests/Imaging): chronic low back pain, seeking MRI; varicose veins, multiple procedures.    Previous Interventions/Treatments: Massage    Prior Level of Function (PLOF)  Patient previously independent with all ADLs  Exercise/Physical  Activity: walking/dog walking; housework/yardwork/home improvement  Work/School: computer / desk job- hybrid    Patients Living Environment: Reviewed and no concern    Primary Language: English    Patient's Goal(s) for Therapy: bend over, lift, carry without pain    Red Flags: Do you have any of the following? Partially  Fever/chills, unexplained weight changes, dizziness/fainting, unexplained change in bowel or bladder functions, unexplained malaise or muscle weakness, night pain/sweats, numbness or tingling  Does report wetting bed 2 nights ago, is monitoring ; and noted weakness- doctors     Objective:  Objective     Dermatomes/Myotomes    L1: Grossly Intact  L2: Grossly Intact  L3: Grossly Intact  L4: Hyposensitive  L5: Grossly Intact  S1: Grossly Intact    L1: 5/5  L2: 5/5  L3: 5/5  L4: 4/5  L5: 5/5  S1: 5/5              ROM    Lumbar AROM (%)    Flexion: 60 %  Extension: 90%  (L) Side Bend: WNL  (R) Side Bend: WNL        Hip PROM (Degrees)      (R)  (L)  Flexion: WFL  WFL   Extension: WFL  WFL       ER:  50  40-pain     IR:  25  25-pain               Strength Testing    Core/Abdominals: poor    Hip    (R)  (L)  Flexion: 5/5  5/5     Extension: 3/5  3/5    Abduction: ( sitting) 5/5  5/5    Adduction: 5/5  5/5           Palpation: limited sensation L4 on R, diminished medial malli      Flexibility: limited bilateral hips, especially L, limited HS , limited quad, especially R      Gait: toe out,decreased step length, stance time, decreased heel strike        Functional Screening  SL Balance: 15 sec ea          Special Tests    Hip Scouring: P-B  Escobar Test: limited bilateral  THEO Test: P- bilateral  Slump Test: P R     Radicular Symptoms: yes      Outcome Measures:  Other Measures  Oswestry Disablity Index (ZACH): 10%       EDUCATION: Home exercise program, plan of care, activity modifications, pain management, and injury pathology       Goals: Set and discussed today  Active       PT Problem       PT Goal 1        Start:  05/29/24    Expected End:  06/26/24       STG 4 weeks:  Pt will be independent with home exercise program for self management of symptoms.  Pt will be able to independently don and doff clothing without increase in pain for improved ADL performance.  Pt will improve lumbar flexion AROM to 80% to improve ADL performance             PT Goal 2       Start:  05/29/24    Expected End:  08/21/24       LTG 8+ weeks:  Pt will be able to lift 20# or greater from floor to waist height without increase in pain while maintaining neutral lumbar positioning to decrease stress on lumbar spine and improve functional mobility for ADLs and IADLs.  Pt will verbalize understanding of ergonomic office alignment to decrease postural stress on body and increase tolerance for work/study/household duties.  Pt will ambulate for full day on level surface without increase in pain and with minimal limp or normalized gait pattern to promote improved functional mobility.  Pt will improve OSWESTRY functional outcome score by MCID points to demonstrate improved functional mobility for ADLs and IADLs.  Pt will improve MMT of hip ext/ABD and ankle DF on R to 5/5 in order to improve wbing and walking tolerance.             Plan of care was developed with input and agreement by the patient      Treatment Performed:  Codes:  Low complex eval  TEx2  Access Code: [email or web address]  URL: [email or web address]  Date: 05/29/2024  Prepared by: Boris Gamble    Exercises  - Supine Bridge with Resistance Band  - 1 x daily - 7 x weekly - 3 sets - 10 reps - 5 hold  - Standing Lumbar Extension  - 5 x daily - 7 x weekly - 3 sets - 10 reps  - Seated Slump Nerve Glide  - 1 x daily - 7 x weekly - 3 sets - 10 reps  Pt edu x 11 min- spine and neuroanatomy, px, dx, home program    Assessment: Patient presents with signs and symptoms consistent with R side sciatica and / or L4 nerve impingement, resulting in limited participation in  pain-free ADLs and inability to perform at their prior level of function. Pt would benefit from physical therapy to address the impairments found & listed previously in the objective section in order to return to safe and pain-free ADLs and prior level of function.     Clinical Presentation: Stable      Plan:     Planned Interventions include: therapeutic exercise, self-care home management, manual therapy, therapeutic activities, gait training, neuromuscular coordination, vasopneumatic, dry needling, aquatic therapy  Frequency: 1-2 x Week  Duration: 12 Weeks     Lumbar extension, try seated flexion as tolerated. Glute and lateral hip strength, leon Gamble PT, DPT #[id number]

## 2024-06-05 ENCOUNTER — TREATMENT (OUTPATIENT)
Dept: PHYSICAL THERAPY | Facility: CLINIC | Age: 39
End: 2024-06-05
Payer: COMMERCIAL

## 2024-06-05 DIAGNOSIS — G89.29 CHRONIC BILATERAL LOW BACK PAIN WITH BILATERAL SCIATICA: Primary | ICD-10-CM

## 2024-06-05 DIAGNOSIS — M62.81 MUSCLE WEAKNESS: ICD-10-CM

## 2024-06-05 DIAGNOSIS — M54.41 CHRONIC BILATERAL LOW BACK PAIN WITH BILATERAL SCIATICA: Primary | ICD-10-CM

## 2024-06-05 DIAGNOSIS — M54.42 CHRONIC BILATERAL LOW BACK PAIN WITH BILATERAL SCIATICA: Primary | ICD-10-CM

## 2024-06-05 DIAGNOSIS — R27.9 LACK OF COORDINATION: ICD-10-CM

## 2024-06-05 PROCEDURE — 97112 NEUROMUSCULAR REEDUCATION: CPT | Mod: GP

## 2024-06-05 PROCEDURE — 97110 THERAPEUTIC EXERCISES: CPT | Mod: GP

## 2024-06-05 ASSESSMENT — PAIN SCALES - GENERAL: PAINLEVEL_OUTOF10: 4

## 2024-06-05 ASSESSMENT — PAIN - FUNCTIONAL ASSESSMENT: PAIN_FUNCTIONAL_ASSESSMENT: 0-10

## 2024-06-05 NOTE — PROGRESS NOTES
Physical Therapy  Physical Therapy Treatment    Patient Name: Bert Abraham  MRN: [id number]  Today's Date: 6/5/2024  Time Calculation  Start Time: 1308  Stop Time: 1346  Time Calculation (min): 38 min    Insurance:  Visit number: 2 of 20  Authorization info: NO  Insurance Type: MMO     General:  Reason for visit: Chronic bilateral low back pain with bilateral sciatica   Referred by: Antonieta Monaco DO       Current Problem  1. Chronic bilateral low back pain with bilateral sciatica        2. Muscle weakness        3. Lack of coordination                 Pain Assessment: 0-10  Pain Score: 4  Pain Location: Back  Pain Orientation: Left, Posterior  Pain Radiating Towards: down into glute    Subjective:   Patient reports pain across the low back starting Saturday is now into L glute today. Numbness is much better than   HEP Performed:  Partially- standing ext helps    Objective:   Lumbar ext min loss- less pain and improved.  Improved abdominal activation.      Treatments:   Stepper 5 min 6 resistance x5min  Standing lumbar ext 3x10- better  and centralization  Bridge 3x10-3 sec   Modified curl up- 3x6-breath hold  Pulldown with recip march- [id number]  Charges:     Access Code: [email or web address]  URL: [email or web address]  Date: 06/05/2024  Prepared by: Boris Gamble    Exercises  - Supine Bridge with Resistance Band  - 1 x daily - 7 x weekly - 3 sets - 10 reps - 5 hold  - Standing Lumbar Extension  - 5 x daily - 7 x weekly - 3 sets - 10 reps  - Seated Slump Nerve Glide  - 1 x daily - 7 x weekly - 3 sets - 10 reps  - Neutral Curl Up with Straight Leg  - 1 x daily - 7 x weekly - 3 sets - 10 reps  Pt edu x 10 min- spine and neuroanatomy, anatomy and bracing, px, dx, home program    Assessment:   Repeated ext centralized and reduced pain to 2/10 in middle low back.  Pt then able to perform bridge without pain after cueing to brace core, and felt glutes working with no back pain.  Pt progressed to  pulldown with alternating march without pain, able to feel much better upon leaving.        Plan: Lumbar extension, try seated flexion as tolerated. Glute and lateral hip strength, catcow - vanessa big 3 progression    Boris Gamble PT, DPT #[id number]

## 2024-06-12 ENCOUNTER — TREATMENT (OUTPATIENT)
Dept: PHYSICAL THERAPY | Facility: CLINIC | Age: 39
End: 2024-06-12
Payer: COMMERCIAL

## 2024-06-12 DIAGNOSIS — M54.41 CHRONIC BILATERAL LOW BACK PAIN WITH BILATERAL SCIATICA: ICD-10-CM

## 2024-06-12 DIAGNOSIS — G89.29 CHRONIC BILATERAL LOW BACK PAIN WITH BILATERAL SCIATICA: ICD-10-CM

## 2024-06-12 DIAGNOSIS — M62.81 MUSCLE WEAKNESS: ICD-10-CM

## 2024-06-12 DIAGNOSIS — M54.42 CHRONIC BILATERAL LOW BACK PAIN WITH BILATERAL SCIATICA: ICD-10-CM

## 2024-06-12 DIAGNOSIS — R27.9 LACK OF COORDINATION: ICD-10-CM

## 2024-06-12 PROCEDURE — 97112 NEUROMUSCULAR REEDUCATION: CPT | Mod: GP

## 2024-06-12 PROCEDURE — 97110 THERAPEUTIC EXERCISES: CPT | Mod: GP

## 2024-06-12 ASSESSMENT — PAIN - FUNCTIONAL ASSESSMENT: PAIN_FUNCTIONAL_ASSESSMENT: 0-10

## 2024-06-12 ASSESSMENT — PAIN SCALES - GENERAL: PAINLEVEL_OUTOF10: 3

## 2024-06-12 NOTE — PROGRESS NOTES
Physical Therapy  Physical Therapy Treatment    Patient Name: Bert Abraham  MRN: [id number]  Today's Date: 6/12/2024  Time Calculation  Start Time: 1114  Stop Time: 1207  Time Calculation (min): 53 min    Insurance:  Visit number: 3 of 20  Authorization info: NO  Insurance Type: MMO     General:  Reason for visit: Chronic bilateral low back pain with bilateral sciatica   Referred by: Antonieta Monaco DO       Current Problem  1. Chronic bilateral low back pain with bilateral sciatica  Follow Up In Physical Therapy      2. Muscle weakness  Follow Up In Physical Therapy      3. Lack of coordination  Follow Up In Physical Therapy          Precautions  Precautions Comment: none    Pain Assessment: 0-10  Pain Score: 3  Pain Location: Back  Pain Orientation: Left  Pain Radiating Towards: down to calf    Subjective:   Patient reports still having low back to leg pain down to L calf currently.  HEP Performed:  Partially- standing ext helps, nerve floss is the best- does once a day ; not complaint with core exercises    Objective:   Lumbar ext min loss- less pain and improved.  Improved abdominal activation.      Treatments:   Nerve floss on L- 3x10  Seated banded hip opener: 3x10-5sec hold dbl green (25lbs band)  Standing lumbar ext 3x10- better  and centralization again  Calf stretch: 3h52lwn- wall lunge  Bridge 3x10-5 sec   Modified curl up- 2x10-breath hold  DNS banded Heel touches- [id number] GTB  Lateral heel touchdown- 4in = x12 ea  Row with same side march- 2x10 ea side  Charges: TE x3 NMR x1    Access Code: [email or web address]  URL: [email or web address]  Date: 06/05/2024  Prepared by: Boris Gamble    Exercises  - Supine Bridge with Resistance Band  - 1 x daily - 7 x weekly - 3 sets - 10 reps - 5 hold  - Standing Lumbar Extension  - 5 x daily - 7 x weekly - 3 sets - 10 reps  - Seated Slump Nerve Glide  - 1 x daily - 7 x weekly - 3 sets - 10 reps  - Neutral Curl Up with Straight Leg  - 1 x daily - 7 x  weekly - 3 sets - 10 reps  Pt edu x 10 min- spine and neuroanatomy, anatomy and bracing, px, dx, home program    Assessment:   Pt continues to have pain and symptom relief with repeated extension in standing and most relief with nerve floss. Pt also improved tolerance with glute loading after focusing on abdominal contraction with curl up and banded DNS heel touches. After glute and abdominal activation pt was able to performing functional strength training without pain to progress toward pain free dog walking. Pt required cueing for abdominal activation to stabilize spine and assist in learning movement pattern for bridge as well as tactile and visual cueing for hip hinge for lateral heel touchdown.          Plan:   Lumbar extension, try seated flexion as tolerated. Glute and lateral hip strength, catcow - vanessa big 3 progression.     Boris Gamble PT, DPT #[id number]

## 2024-06-19 NOTE — PROGRESS NOTES
"Physical Therapy  Physical Therapy Treatment    Patient Name: Bert Abraham  MRN: [id number]  Today's Date: 6/20/2024  Time Calculation  Start Time: 0945  Stop Time: 1040  Time Calculation (min): 55 min    Insurance:  Visit number: 4 of 20  Authorization info: NO  Insurance Type: MMO     General:  Reason for visit: Chronic bilateral low back pain with bilateral sciatica   Referred by: Antonieta Moanco DO       Current Problem  1. Chronic bilateral low back pain with bilateral sciatica  Follow Up In Physical Therapy      2. Muscle weakness  Follow Up In Physical Therapy      3. Lack of coordination  Follow Up In Physical Therapy            Precautions  Precautions Comment: none    Pain Assessment: 0-10  0-10 (Numeric) Pain Score: 0 - No pain  Pain Location: Back  Pain Orientation: Left  Pain Radiating Towards: down to calf    Subjective:   Patient reports that the left calf is now beginning to feel the radiating pain and that they would like to be re-evaluated because they feel that their problem is changing.     HEP Performed:  Yes    Objective:   Trunk flexion compensation during heel taps on stairs.     Treatments:   Seated pelvic tilts 20 x  Calve stretch rocker board 1'   Cybex walkouts 25# bracing core 2x10  Seated pelvic tilts 20 x  Cat cows x20   Supine double UE press downs 10x 10\"  Cat cows x15   Quadruped lateral flexion x20   Resisted side stepping with blue theraband 4 laps in bars   Cat cows x 10   Heel taps 2x10 4 inch   AP supine hip mobilization 5'   Leg pulls 5'     Charges: TE x3 manual x1    Access Code: [email or web address]  URL: [email or web address]  Date: 06/20/2024  Prepared by: Ryan Bella    Exercises  - Supine Bridge with Resistance Band  - 1 x daily - 7 x weekly - 3 sets - 10 reps - 5 hold  - Standing Lumbar Extension  - 5 x daily - 7 x weekly - 3 sets - 10 reps  - Seated Slump Nerve Glide  - 1 x daily - 7 x weekly - 3 sets - 10 reps  - Neutral Curl Up with Straight Leg  - 1 x " daily - 7 x weekly - 3 sets - 10 reps  - Seated Pelvic Tilts  - 1 x daily - 7 x weekly - 3 sets - 10 reps  - Cat Cow  - 1 x daily - 7 x weekly - 3 sets - 10 reps    Assessment:    Pt tolerated treatment session well having reported centralization of symptoms post cat cows and seated pelvic tilts. Pt educated on centralization and anatomy long term results vs short results of these exercises. Post treatment session pt reported that symptoms radiating down into left calve had decreased to zero and felt better in general with very low level's of lower back pain reported.     Plan:  Continue to mobilize pelvis and lower back. Continue performing core stiffness exercises.     Ryan Bella

## 2024-06-20 ENCOUNTER — TREATMENT (OUTPATIENT)
Dept: PHYSICAL THERAPY | Facility: CLINIC | Age: 39
End: 2024-06-20
Payer: COMMERCIAL

## 2024-06-20 DIAGNOSIS — M54.42 CHRONIC BILATERAL LOW BACK PAIN WITH BILATERAL SCIATICA: Primary | ICD-10-CM

## 2024-06-20 DIAGNOSIS — M54.41 CHRONIC BILATERAL LOW BACK PAIN WITH BILATERAL SCIATICA: Primary | ICD-10-CM

## 2024-06-20 DIAGNOSIS — R27.9 LACK OF COORDINATION: ICD-10-CM

## 2024-06-20 DIAGNOSIS — M62.81 MUSCLE WEAKNESS: ICD-10-CM

## 2024-06-20 DIAGNOSIS — G89.29 CHRONIC BILATERAL LOW BACK PAIN WITH BILATERAL SCIATICA: Primary | ICD-10-CM

## 2024-06-20 PROCEDURE — 97140 MANUAL THERAPY 1/> REGIONS: CPT | Mod: GP,CQ

## 2024-06-20 PROCEDURE — 97110 THERAPEUTIC EXERCISES: CPT | Mod: GP,CQ

## 2024-06-20 ASSESSMENT — PAIN SCALES - GENERAL: PAINLEVEL_OUTOF10: 0 - NO PAIN

## 2024-06-20 ASSESSMENT — PAIN - FUNCTIONAL ASSESSMENT: PAIN_FUNCTIONAL_ASSESSMENT: 0-10

## 2024-06-27 ENCOUNTER — TREATMENT (OUTPATIENT)
Dept: PHYSICAL THERAPY | Facility: CLINIC | Age: 39
End: 2024-06-27
Payer: COMMERCIAL

## 2024-06-27 DIAGNOSIS — R27.9 LACK OF COORDINATION: ICD-10-CM

## 2024-06-27 DIAGNOSIS — M62.81 MUSCLE WEAKNESS: ICD-10-CM

## 2024-06-27 DIAGNOSIS — G89.29 CHRONIC BILATERAL LOW BACK PAIN WITH BILATERAL SCIATICA: ICD-10-CM

## 2024-06-27 DIAGNOSIS — M54.41 CHRONIC BILATERAL LOW BACK PAIN WITH BILATERAL SCIATICA: ICD-10-CM

## 2024-06-27 DIAGNOSIS — M54.42 CHRONIC BILATERAL LOW BACK PAIN WITH BILATERAL SCIATICA: ICD-10-CM

## 2024-06-27 PROCEDURE — 97150 GROUP THERAPEUTIC PROCEDURES: CPT | Mod: GP,CQ

## 2024-06-27 ASSESSMENT — PAIN SCALES - GENERAL: PAINLEVEL_OUTOF10: 0 - NO PAIN

## 2024-06-27 ASSESSMENT — PAIN - FUNCTIONAL ASSESSMENT: PAIN_FUNCTIONAL_ASSESSMENT: 0-10

## 2024-06-27 NOTE — PROGRESS NOTES
"Physical Therapy  Physical Therapy Treatment    Patient Name: Bert Abraham  MRN: [id number]  Today's Date: 6/27/2024  Time Calculation  Start Time: 1032  Stop Time: 1111  Time Calculation (min): 39 min    Insurance:  Visit number: 5 of 20  Authorization info: NO  Insurance Type: MMO     General:  Reason for visit: Chronic bilateral low back pain with bilateral sciatica   Referred by: Antonieta Monaco DO       Current Problem  1. Chronic bilateral low back pain with bilateral sciatica  Follow Up In Physical Therapy      2. Muscle weakness  Follow Up In Physical Therapy      3. Lack of coordination  Follow Up In Physical Therapy          Precautions  Precautions Comment: none    Pain Assessment: 0-10  0-10 (Numeric) Pain Score: 0 - No pain    Subjective:   Patient reports that they feel they have been doing better in general but had one bad day due to picking up cat litter and feeling a pain in the front of their hip. Pt reports taking muscle relaxors and NSAID to help decrease pain.     HEP Performed:  Yes    Objective:   Trunk rotation compensation occurring during fire hydrants toward LE side being lifted.     Treatments:   R bike 5'   Supine hamstring stretch 1'  Supine active hamstring stretch 10x5\"   LTR x20  Functional box lifts x10 25# + box   Lateral marches with cybex x10 20#   Lateral marches with cybex x10 22.5#  Fire hydrants x10   Standing hip abduction 3x10 GTB    Charges: TE x3    Access Code: [email or web address]  URL: [email or web address]  Date: 06/20/2024  Prepared by: Ryan Bella    Exercises  - Supine Bridge with Resistance Band  - 1 x daily - 7 x weekly - 3 sets - 10 reps - 5 hold  - Standing Lumbar Extension  - 5 x daily - 7 x weekly - 3 sets - 10 reps  - Seated Slump Nerve Glide  - 1 x daily - 7 x weekly - 3 sets - 10 reps  - Neutral Curl Up with Straight Leg  - 1 x daily - 7 x weekly - 3 sets - 10 reps  - Seated Pelvic Tilts  - 1 x daily - 7 x weekly - 3 sets - 10 reps  - Cat Cow  " - 1 x daily - 7 x weekly - 3 sets - 10 reps    Assessment:   Pt tolerated treatment session well with no increase in back pain with exercises performed in today's session. Pt reports that they feel they are still going in the correct direction with therapy as of right now.     Plan:  Continue to progress core strengthening and lifting mechanics.     Ryan Bella

## 2024-07-03 ENCOUNTER — TREATMENT (OUTPATIENT)
Dept: PHYSICAL THERAPY | Facility: CLINIC | Age: 39
End: 2024-07-03
Payer: COMMERCIAL

## 2024-07-03 DIAGNOSIS — R27.9 LACK OF COORDINATION: ICD-10-CM

## 2024-07-03 DIAGNOSIS — M54.41 CHRONIC BILATERAL LOW BACK PAIN WITH BILATERAL SCIATICA: ICD-10-CM

## 2024-07-03 DIAGNOSIS — G89.29 CHRONIC BILATERAL LOW BACK PAIN WITH BILATERAL SCIATICA: ICD-10-CM

## 2024-07-03 DIAGNOSIS — M62.81 MUSCLE WEAKNESS: ICD-10-CM

## 2024-07-03 DIAGNOSIS — M54.42 CHRONIC BILATERAL LOW BACK PAIN WITH BILATERAL SCIATICA: ICD-10-CM

## 2024-07-03 PROCEDURE — 97110 THERAPEUTIC EXERCISES: CPT | Mod: GP

## 2024-07-03 PROCEDURE — 97112 NEUROMUSCULAR REEDUCATION: CPT | Mod: GP

## 2024-07-03 ASSESSMENT — PAIN - FUNCTIONAL ASSESSMENT: PAIN_FUNCTIONAL_ASSESSMENT: 0-10

## 2024-07-03 ASSESSMENT — PAIN SCALES - GENERAL: PAINLEVEL_OUTOF10: 2

## 2024-07-03 NOTE — PROGRESS NOTES
"Physical Therapy  Physical Therapy Treatment    Patient Name: Bert Abraham  MRN: [id number]  Today's Date: 7/3/2024  Time Calculation  Start Time: 1450  Stop Time: 1528  Time Calculation (min): 38 min    Insurance:  Visit number: 6 of 20  Authorization info: NO  Insurance Type: MMO     General:  Reason for visit: Chronic bilateral low back pain with bilateral sciatica   Referred by: Antonieta Monaco DO       Current Problem  1. Chronic bilateral low back pain with bilateral sciatica  Follow Up In Physical Therapy      2. Muscle weakness  Follow Up In Physical Therapy      3. Lack of coordination  Follow Up In Physical Therapy            Precautions  Precautions Comment: none    Pain Assessment: 0-10  0-10 (Numeric) Pain Score: 2  Pain Location: Back  Pain Orientation: Left  Pain Radiating Towards: proximal Hs to calf    Subjective:   Patient reports continuing to get better with PT, home exercise and worked well with phil. Most pain with SL wbing on L.    HEP Performed:  Yes    Objective:   R UT shrug compensation with carry  Forward trunk for hinge  Treatments:   Elliptical 5'   Nerve floss x10  Supine banded hamstring stretch 1'  Standing active hamstring stretch [id number]\"   Banded KB suitcase 8kg + green [id number]  1A puldown to Sl stance for posterior sling 2x10 ea  RDL pole pull hinge 2x5- 10sec ea  Charges: TE x2 NMRx1    Access Code: [email or web address]  URL: [email or web address]  Date: 06/20/2024  Prepared by: Phil Bella    Exercises  - Supine Bridge with Resistance Band  - 1 x daily - 7 x weekly - 3 sets - 10 reps - 5 hold  - Standing Lumbar Extension  - 5 x daily - 7 x weekly - 3 sets - 10 reps  - Seated Slump Nerve Glide  - 1 x daily - 7 x weekly - 3 sets - 10 reps  - Neutral Curl Up with Straight Leg  - 1 x daily - 7 x weekly - 3 sets - 10 reps  - Seated Pelvic Tilts  - 1 x daily - 7 x weekly - 3 sets - 10 reps  - Cat Cow  - 1 x daily - 7 x weekly - 3 sets - 10 reps    Assessment:   The focus " Anesthesia Pre Eval Note    Anesthesia ROS/Med Hx        Anesthetic Complication History:  Patient does not have a history of anesthetic complications      Pulmonary Review:  History of: asthma -     Neuro/Psych Review:    Positive for psychiatric history    Cardiovascular Review:    Positive for PVD  Positive for hyperlipidemia    GI/HEPATIC/RENAL Review:  Patient does not have a GI/hepatic/renalhistory       End/Other Review:  Patient does not have an endo/other history        Relevant Problems   No relevant active problems       Physical Exam     Airway   Mallampati: II  TM Distance: >3 FB  Neck ROM: Limited    Cardiovascular  Cardiovascular exam normal    Dental Exam  Dental exam normal    Pulmonary Exam  Pulmonary exam normal    Abdominal Exam  Abdominal exam normal      Anesthesia Plan  No phone call attempted due to:  ASA Status: 3    Anesthesia Type: MAC    Premedication: None      Risks Discussed: Nausea, Dental Injury, Vomiting, Hypotension, Headache, Allergic Reaction, Nerve Injury, Aspiration, Sore Throat, Intra-operative Awareness and Emergence Delirium    Post-op Pain Management: Per Surgeon      Checklist  Reviewed: Lab Results, Allergies, Outside Records, Nursing Notes, Medications, Care Everywhere, Problem list, Patient Summary, Past Med History, NPO Status, Beta Blocker Status and EKG    Informed Consent  The proposed anesthetic plan, including its risks and benefits, have been discussed with the Patient  - along with the risks and benefits of alternatives.  Questions were encouraged and answered and the patient and/or representative understands and agrees to proceed.        of the session was Strengthening, ROM, Stretching, Motor Control, Dynamic Stability Training, and functional training. The pt demonstrated Fair tolerance to the noted exercises today. The pt is demonstrated Fair progress in skilled rehab at this time. The pt is still limited in overall Strength, ROM, Flexibility, Motor control, Balance, Gait mechanics, and Pain at this time. The pt continues to be a good candidate for skilled PT, in order to further improve Strength, ROM, Flexibility, Motor control, Balance, Gait mechanics, and Pain.       Plan:  Continue to progress core strengthening and lifting mechanics.     Boris Gamble PT, DPT #[id number]

## 2024-07-10 ENCOUNTER — TREATMENT (OUTPATIENT)
Dept: PHYSICAL THERAPY | Facility: CLINIC | Age: 39
End: 2024-07-10
Payer: COMMERCIAL

## 2024-07-10 DIAGNOSIS — G89.29 CHRONIC BILATERAL LOW BACK PAIN WITH BILATERAL SCIATICA: ICD-10-CM

## 2024-07-10 DIAGNOSIS — M54.42 CHRONIC BILATERAL LOW BACK PAIN WITH BILATERAL SCIATICA: ICD-10-CM

## 2024-07-10 DIAGNOSIS — M54.41 CHRONIC BILATERAL LOW BACK PAIN WITH BILATERAL SCIATICA: ICD-10-CM

## 2024-07-10 DIAGNOSIS — R27.9 LACK OF COORDINATION: ICD-10-CM

## 2024-07-10 DIAGNOSIS — M62.81 MUSCLE WEAKNESS: ICD-10-CM

## 2024-07-10 PROCEDURE — 97112 NEUROMUSCULAR REEDUCATION: CPT | Mod: GP

## 2024-07-10 PROCEDURE — 97110 THERAPEUTIC EXERCISES: CPT | Mod: GP

## 2024-07-10 NOTE — PROGRESS NOTES
"Physical Therapy  Physical Therapy Treatment    Patient Name: Bert Abraham  MRN: [id number]  Today's Date: 7/10/2024  Time Calculation  Start Time: 1315  Stop Time: 1400  Time Calculation (min): 45 min    Insurance:  Visit number: 7 of 20  Authorization info: NO  Insurance Type: MMO     General:  Reason for visit: Chronic bilateral low back pain with bilateral sciatica   Referred by: Antonieta Monaco DO       Current Problem  1. Chronic bilateral low back pain with bilateral sciatica  Follow Up In Physical Therapy      2. Muscle weakness  Follow Up In Physical Therapy      3. Lack of coordination  Follow Up In Physical Therapy                        Subjective:   Patient reports feeling like he isnt making progress, feels this pain should be done but always gets pain in leg when wbing for too long. The relief he had is not as good but also isnt consistent with exercises  HEP Performed:  partially, getting worse with compliance    Objective:   R UT shrug compensation with carry- decreased  Forward trunk for hinge- improved    Treatments:   Airex bal x30 sec ea- tandem and instep  Cable paloff 10bs 2x10-3 sec  Banded paloff org [id number] ea  Ext over chair: 2x10  Nerve floss 2x10  Standing active hamstring stretch [id number]\"   Banded KB suitcase 8kg + green [id number]  KB OH walk x2laps ea 4kg  KB OH march xalt20 ea 4kg  Banded pulldown to RDL- black 3x8  RDL KB  2x5- 10sec ea  Charges: TE x2 NMRx1    Access Code: [email or web address]  URL: [email or web address]  Date: 07/10/2024  Prepared by: Boris Gamble    Exercises  - Supine Bridge with Resistance Band  - 1 x daily - 7 x weekly - 3 sets - 10 reps - 5 hold  - Standing Lumbar Extension  - 5 x daily - 7 x weekly - 3 sets - 10 reps  - Seated Slump Nerve Glide  - 1 x daily - 7 x weekly - 3 sets - 10 reps  - Neutral Curl Up with Straight Leg  - 1 x daily - 7 x weekly - 3 sets - 10 reps  - Seated Pelvic Tilts  - 1 x daily - 7 x weekly - 3 sets - 10 reps  - Cat " Cow  - 1 x daily - 7 x weekly - 3 sets - 10 reps  - Standing Hip Abduction Kicks  - 1 x daily - 7 x weekly - 3 sets - 10 reps  - Standing Elbow Extension with Anchored Resistance  - 1 x daily - 7 x weekly - 3 sets - 8 reps  - Maori Deadlift With Barbell  - 1 x daily - 7 x weekly - 3 sets - 8 reps  - Standing Anti-Rotation Press with Anchored Resistance  - 1 x daily - 7 x weekly - 3 sets - 10 reps - 5 hold    Assessment:   The focus of the session was Strengthening, ROM, Stretching, Balance, Motor Control, Dynamic Stability Training, and functional training. The pt demonstrated Good and Improving tolerance to the noted exercises today. The pt is demonstrated Good and Improving progress in skilled rehab at this time. The pt is still limited in overall Strength, ROM, Flexibility, Motor control, and Pain at this time. The pt continues to be a good candidate for skilled PT, in order to further improve Strength, ROM, Flexibility, Motor control, Balance, and Pain. Pt improved lat engagement posterior chain and spinal positioning much during session and able to leave pain free.        Plan:  Continue to progress core strengthening and lifting mechanics.     Boris Gamble PT, DPT #[id number]

## 2024-07-12 DIAGNOSIS — E11.9 TYPE 2 DIABETES MELLITUS WITHOUT COMPLICATION, WITHOUT LONG-TERM CURRENT USE OF INSULIN (MULTI): Primary | ICD-10-CM

## 2024-07-18 ENCOUNTER — PATIENT MESSAGE (OUTPATIENT)
Dept: PRIMARY CARE | Facility: CLINIC | Age: 39
End: 2024-07-18
Payer: COMMERCIAL

## 2024-07-18 DIAGNOSIS — E11.9 TYPE 2 DIABETES MELLITUS WITHOUT COMPLICATION, WITHOUT LONG-TERM CURRENT USE OF INSULIN (MULTI): ICD-10-CM

## 2024-07-23 RX ORDER — SEMAGLUTIDE 2.68 MG/ML
2 INJECTION, SOLUTION SUBCUTANEOUS
Qty: 9 ML | Refills: 0 | Status: SHIPPED | OUTPATIENT
Start: 2024-07-28

## 2024-07-24 ENCOUNTER — APPOINTMENT (OUTPATIENT)
Dept: PHYSICAL THERAPY | Facility: CLINIC | Age: 39
End: 2024-07-24
Payer: COMMERCIAL

## 2024-07-31 ENCOUNTER — TREATMENT (OUTPATIENT)
Dept: PHYSICAL THERAPY | Facility: CLINIC | Age: 39
End: 2024-07-31
Payer: COMMERCIAL

## 2024-07-31 DIAGNOSIS — M54.42 CHRONIC BILATERAL LOW BACK PAIN WITH BILATERAL SCIATICA: ICD-10-CM

## 2024-07-31 DIAGNOSIS — R27.9 LACK OF COORDINATION: ICD-10-CM

## 2024-07-31 DIAGNOSIS — M54.41 CHRONIC BILATERAL LOW BACK PAIN WITH BILATERAL SCIATICA: ICD-10-CM

## 2024-07-31 DIAGNOSIS — G89.29 CHRONIC BILATERAL LOW BACK PAIN WITH BILATERAL SCIATICA: ICD-10-CM

## 2024-07-31 DIAGNOSIS — M62.81 MUSCLE WEAKNESS: ICD-10-CM

## 2024-07-31 PROCEDURE — 97110 THERAPEUTIC EXERCISES: CPT | Mod: GP

## 2024-07-31 PROCEDURE — 97140 MANUAL THERAPY 1/> REGIONS: CPT | Mod: GP

## 2024-07-31 NOTE — PROGRESS NOTES
Physical Therapy  Physical Therapy Treatment    Patient Name: Bert Abraham  MRN: [id number]  Today's Date: 7/31/2024  Time Calculation  Start Time: 1700  Stop Time: 1740  Time Calculation (min): 40 min    Insurance:  Visit number: 8 of 20  Authorization info: NO  Insurance Type: MMO     General:  Reason for visit: Chronic bilateral low back pain with bilateral sciatica   Referred by: Antonieta Monaco DO       Current Problem  1. Chronic bilateral low back pain with bilateral sciatica  Follow Up In Physical Therapy      2. Muscle weakness  Follow Up In Physical Therapy      3. Lack of coordination  Follow Up In Physical Therapy                      Reports no pain but numbness intensity is 8-9/10    Subjective:   Patient reports having flare up after having to prepare for new windows being put in, pushed too hard, now has numbness all the way down to pinky toe. No exercises have made him any better  HEP Performed:  reports yes    Objective:   R UT shrug compensation with carry- decreased  Forward trunk for hinge- improved    Treatments:   Sciatic nerve floss x10  Prone lying x3min  Prone press up x10  Modified curl up 3x6-breath hold  Banded traction hang x5min    Manual:  Cupping x17min - para spinals, L glute max, med, pirfirofmis  Graston x9min- L calf, HS, peroneals, plantar fascia   Charges: TE x1 Manual x2    Access Code: [email or web address]  URL: [email or web address]  Date: 07/10/2024  Prepared by: Boris Gamble    Exercises  - Supine Bridge with Resistance Band  - 1 x daily - 7 x weekly - 3 sets - 10 reps - 5 hold  - Standing Lumbar Extension  - 5 x daily - 7 x weekly - 3 sets - 10 reps  - Seated Slump Nerve Glide  - 1 x daily - 7 x weekly - 3 sets - 10 reps  - Neutral Curl Up with Straight Leg  - 1 x daily - 7 x weekly - 3 sets - 10 reps  - Seated Pelvic Tilts  - 1 x daily - 7 x weekly - 3 sets - 10 reps  - Cat Cow  - 1 x daily - 7 x weekly - 3 sets - 10 reps  - Standing Hip Abduction Kicks   - 1 x daily - 7 x weekly - 3 sets - 10 reps  - Standing Elbow Extension with Anchored Resistance  - 1 x daily - 7 x weekly - 3 sets - 8 reps  - [place] Deadlift With Barbell  - 1 x daily - 7 x weekly - 3 sets - 8 reps  - Standing Anti-Rotation Press with Anchored Resistance  - 1 x daily - 7 x weekly - 3 sets - 10 reps - 5 hold    Assessment:   Pt came in with symptoms very flared up up with new numbness all the way down L LE to pinky toe. Felt mild relief with manual but no improvement, still numbness from upper calf down to pinky toe. with exercise. Pt is going to consult with doctor and appeal for MRI.          Plan:  Ask about MRI/ doc visit, plan moving forward. May DC due to lack of progress.    Boris Gamble PT, DPT #[id number]

## 2024-08-05 ENCOUNTER — APPOINTMENT (OUTPATIENT)
Dept: RADIOLOGY | Facility: CLINIC | Age: 39
End: 2024-08-05
Payer: COMMERCIAL

## 2024-08-06 ENCOUNTER — LAB (OUTPATIENT)
Dept: LAB | Facility: LAB | Age: 39
End: 2024-08-06
Payer: COMMERCIAL

## 2024-08-06 ENCOUNTER — APPOINTMENT (OUTPATIENT)
Dept: PRIMARY CARE | Facility: CLINIC | Age: 39
End: 2024-08-06
Payer: COMMERCIAL

## 2024-08-06 VITALS
TEMPERATURE: 97.6 F | DIASTOLIC BLOOD PRESSURE: 80 MMHG | WEIGHT: 315 LBS | BODY MASS INDEX: 36.45 KG/M2 | OXYGEN SATURATION: 96 % | HEART RATE: 96 BPM | HEIGHT: 78 IN | SYSTOLIC BLOOD PRESSURE: 112 MMHG | RESPIRATION RATE: 15 BRPM

## 2024-08-06 DIAGNOSIS — H53.9 CHANGE IN VISION: ICD-10-CM

## 2024-08-06 DIAGNOSIS — E66.01 CLASS 3 SEVERE OBESITY DUE TO EXCESS CALORIES WITH SERIOUS COMORBIDITY AND BODY MASS INDEX (BMI) OF 50.0 TO 59.9 IN ADULT (MULTI): ICD-10-CM

## 2024-08-06 DIAGNOSIS — R20.0 NUMBNESS: ICD-10-CM

## 2024-08-06 DIAGNOSIS — R29.898 LEG WEAKNESS, BILATERAL: ICD-10-CM

## 2024-08-06 DIAGNOSIS — E03.8 SUBCLINICAL HYPOTHYROIDISM: ICD-10-CM

## 2024-08-06 DIAGNOSIS — Z11.59 ENCOUNTER FOR HEPATITIS C SCREENING TEST FOR LOW RISK PATIENT: ICD-10-CM

## 2024-08-06 DIAGNOSIS — E11.9 TYPE 2 DIABETES MELLITUS WITHOUT COMPLICATION, WITHOUT LONG-TERM CURRENT USE OF INSULIN (MULTI): ICD-10-CM

## 2024-08-06 DIAGNOSIS — Z89.411: Primary | ICD-10-CM

## 2024-08-06 LAB
CHOLEST SERPL-MCNC: 202 MG/DL (ref 0–199)
CHOLESTEROL/HDL RATIO: 3.5
CREAT UR-MCNC: 343.6 MG/DL (ref 20–370)
EST. AVERAGE GLUCOSE BLD GHB EST-MCNC: 123 MG/DL
HBA1C MFR BLD: 5.9 %
HCV AB SER QL: NONREACTIVE
HDLC SERPL-MCNC: 57.3 MG/DL
LDLC SERPL CALC-MCNC: 130 MG/DL
MICROALBUMIN UR-MCNC: 55 MG/L
MICROALBUMIN/CREAT UR: 16 UG/MG CREAT
NON HDL CHOLESTEROL: 145 MG/DL (ref 0–149)
T3FREE SERPL-MCNC: 3.5 PG/ML (ref 2.3–4.2)
T4 FREE SERPL-MCNC: 1.08 NG/DL (ref 0.78–1.48)
THYROPEROXIDASE AB SERPL-ACNC: >1000 IU/ML
TRIGL SERPL-MCNC: 74 MG/DL (ref 0–149)
TSH SERPL-ACNC: 2.88 MIU/L (ref 0.44–3.98)
VLDL: 15 MG/DL (ref 0–40)

## 2024-08-06 PROCEDURE — 99214 OFFICE O/P EST MOD 30 MIN: CPT | Performed by: FAMILY MEDICINE

## 2024-08-06 PROCEDURE — 3044F HG A1C LEVEL LT 7.0%: CPT | Performed by: FAMILY MEDICINE

## 2024-08-06 PROCEDURE — 84439 ASSAY OF FREE THYROXINE: CPT

## 2024-08-06 PROCEDURE — 3050F LDL-C >= 130 MG/DL: CPT | Performed by: FAMILY MEDICINE

## 2024-08-06 PROCEDURE — 36415 COLL VENOUS BLD VENIPUNCTURE: CPT

## 2024-08-06 PROCEDURE — 86803 HEPATITIS C AB TEST: CPT

## 2024-08-06 PROCEDURE — 83036 HEMOGLOBIN GLYCOSYLATED A1C: CPT

## 2024-08-06 PROCEDURE — 3079F DIAST BP 80-89 MM HG: CPT | Performed by: FAMILY MEDICINE

## 2024-08-06 PROCEDURE — 86376 MICROSOMAL ANTIBODY EACH: CPT

## 2024-08-06 PROCEDURE — 3074F SYST BP LT 130 MM HG: CPT | Performed by: FAMILY MEDICINE

## 2024-08-06 PROCEDURE — 80061 LIPID PANEL: CPT

## 2024-08-06 PROCEDURE — 82570 ASSAY OF URINE CREATININE: CPT

## 2024-08-06 PROCEDURE — 3008F BODY MASS INDEX DOCD: CPT | Performed by: FAMILY MEDICINE

## 2024-08-06 PROCEDURE — 86800 THYROGLOBULIN ANTIBODY: CPT

## 2024-08-06 PROCEDURE — 84445 ASSAY OF TSI GLOBULIN: CPT

## 2024-08-06 PROCEDURE — 84481 FREE ASSAY (FT-3): CPT

## 2024-08-06 PROCEDURE — 84443 ASSAY THYROID STIM HORMONE: CPT

## 2024-08-06 PROCEDURE — 82043 UR ALBUMIN QUANTITATIVE: CPT

## 2024-08-06 PROCEDURE — 3060F POS MICROALBUMINURIA REV: CPT | Performed by: FAMILY MEDICINE

## 2024-08-06 RX ORDER — DULOXETIN HYDROCHLORIDE 30 MG/1
30 CAPSULE, DELAYED RELEASE ORAL DAILY
Qty: 180 CAPSULE | Refills: 0 | Status: SHIPPED | OUTPATIENT
Start: 2024-08-06 | End: 2024-11-04

## 2024-08-06 ASSESSMENT — PATIENT HEALTH QUESTIONNAIRE - PHQ9
SUM OF ALL RESPONSES TO PHQ9 QUESTIONS 1 AND 2: 0
1. LITTLE INTEREST OR PLEASURE IN DOING THINGS: NOT AT ALL
2. FEELING DOWN, DEPRESSED OR HOPELESS: NOT AT ALL

## 2024-08-06 NOTE — PATIENT INSTRUCTIONS
Numbness and weakness of the legs  - neurology referral  - EMG ordered  - try duloxetine    Labs ordered    Continue to work on healthy diet and exercise  We encourage all patients to engage in exercise 150 minutes per week   Adults 18 and older, activity during each week - if you are able:    Engage in at least 150 minutes of moderate or vigorous exercise per week   If you are able- Engage in muscle strengthening activity on 2 or more days per week        Please follow up in 6months for wellness or as needed.       ** If labs or imaging ordered at today's visit, all the non-urgent results will be discussed at your next visit    If you have been referred for a special test or to a specialist please call  [phone] to schedule an appointment.  If you have any further questions, or if develop new or worsened symptoms, please give our office a call at [phone].

## 2024-08-06 NOTE — PROGRESS NOTES
"Subjective   Patient ID: Bert Abraham is a 39 y.o. male who presents for Follow-up (Pt is here for c/o numbness down left leg, start at thigh down to toes.).    HPI     Has been with chronic numbness down left leg into the fourth and fifth toes and heel starting at the thighs down to the toes but also with chronic back pain  Back pain started 5+yrs ago- improved with time  Completed PT- which did not help much,  Completed several oral prednisone courses which helped temporarily  Still with left leg numbness and left leg pain and tightness  Using gabapentin 900, Celebrex tylenol, currently    Of note was with a recent sciatic flare.  This is slowly resolving and improved was with worsened sciaitic pain   Slowly resolved  Denies loss of control of bowel or bladder, saddle paresthesia, and   + leg weakness  in the calf- but this is better  + leg paresthesia          Review of Systems  All systems reviewed and neg if not noted in the HPI above       Objective   /80 (Patient Position: Sitting)   Pulse 96   Temp 36.4 °C (97.6 °F)   Resp 15   Ht 1.981 m (6' 6\")   Wt (!) 210 kg (462 lb 4.8 oz)   SpO2 96%   BMI 53.42 kg/m²     Physical Exam    Patient appears to be in mild to moderate pain, non-antalgic gait noted.   paraspinal muscles NTTP   No masses.  Painful and reduced LS ROM noted.   Straight leg raise is neg b/l   DTR's, motor strength and sensation normal, including  toe gait, however, unable to heel gait  Peripheral pulses are palpable.      Assessment/Plan   Problem List Items Addressed This Visit             ICD-10-CM    Type 2 diabetes mellitus without complication, without long-term current use of insulin (Multi) E11.9    Relevant Orders    Hemoglobin A1c (Completed)    Lipid Panel (Completed)    Albumin-Creatinine Ratio, Urine Random (Completed)    Subclinical hypothyroidism E03.8    Relevant Orders    TSH (Completed)    T4, free (Completed)    T3, free (Completed)    Thyroid Peroxidase (TPO) " Antibody (Completed)    Thyroid Stimulating Immunoglobulin    Anti-Thyroglobulin Antibody (Completed)    Class 3 severe obesity with body mass index (BMI) of 50.0 to 59.9 in adult (Multi) E66.01, Z68.43    Relevant Orders    Hemoglobin A1c (Completed)    Lipid Panel (Completed)     Other Visit Diagnoses         Codes    Right great toe amputee (Multi)    -  Primary Z89.411    Leg weakness, bilateral     R29.898    Relevant Medications    DULoxetine (Cymbalta) 30 mg DR capsule    Other Relevant Orders    EMG & nerve conduction    Referral to Neurology    Numbness     R20.0    Relevant Medications    DULoxetine (Cymbalta) 30 mg DR capsule    Other Relevant Orders    EMG & nerve conduction    Referral to Neurology    Change in vision     H53.9    Relevant Orders    Referral to Ophthalmology    Encounter for hepatitis C screening test for low risk patient     Z11.59    Relevant Orders    Hepatitis C antibody (Completed)                Please follow up in 6months for wellness or as needed.

## 2024-08-07 ENCOUNTER — APPOINTMENT (OUTPATIENT)
Dept: PHYSICAL THERAPY | Facility: CLINIC | Age: 39
End: 2024-08-07
Payer: COMMERCIAL

## 2024-08-08 LAB — THYROGLOB AB SERPL-ACNC: <0.9 IU/ML (ref 0–4)

## 2024-08-13 LAB — TSI SER-ACNC: <1 TSI INDEX

## 2024-08-14 ENCOUNTER — APPOINTMENT (OUTPATIENT)
Dept: PHYSICAL THERAPY | Facility: CLINIC | Age: 39
End: 2024-08-14
Payer: COMMERCIAL

## 2024-08-26 ENCOUNTER — APPOINTMENT (OUTPATIENT)
Dept: SLEEP MEDICINE | Facility: CLINIC | Age: 39
End: 2024-08-26
Payer: COMMERCIAL

## 2024-09-04 ENCOUNTER — APPOINTMENT (OUTPATIENT)
Dept: NEUROLOGY | Facility: HOSPITAL | Age: 39
End: 2024-09-04
Payer: COMMERCIAL

## 2024-09-04 ENCOUNTER — APPOINTMENT (OUTPATIENT)
Dept: ENDOCRINOLOGY | Facility: CLINIC | Age: 39
End: 2024-09-04
Payer: COMMERCIAL

## 2024-09-04 VITALS
SYSTOLIC BLOOD PRESSURE: 106 MMHG | WEIGHT: 315 LBS | BODY MASS INDEX: 36.45 KG/M2 | HEART RATE: 79 BPM | HEIGHT: 78 IN | DIASTOLIC BLOOD PRESSURE: 72 MMHG

## 2024-09-04 DIAGNOSIS — E11.9 TYPE 2 DIABETES MELLITUS WITHOUT COMPLICATION, WITHOUT LONG-TERM CURRENT USE OF INSULIN (MULTI): ICD-10-CM

## 2024-09-04 DIAGNOSIS — E03.8 SUBCLINICAL HYPOTHYROIDISM: Primary | ICD-10-CM

## 2024-09-04 PROCEDURE — 99204 OFFICE O/P NEW MOD 45 MIN: CPT | Performed by: STUDENT IN AN ORGANIZED HEALTH CARE EDUCATION/TRAINING PROGRAM

## 2024-09-04 PROCEDURE — 3044F HG A1C LEVEL LT 7.0%: CPT | Performed by: STUDENT IN AN ORGANIZED HEALTH CARE EDUCATION/TRAINING PROGRAM

## 2024-09-04 PROCEDURE — 3078F DIAST BP <80 MM HG: CPT | Performed by: STUDENT IN AN ORGANIZED HEALTH CARE EDUCATION/TRAINING PROGRAM

## 2024-09-04 PROCEDURE — 3008F BODY MASS INDEX DOCD: CPT | Performed by: STUDENT IN AN ORGANIZED HEALTH CARE EDUCATION/TRAINING PROGRAM

## 2024-09-04 PROCEDURE — 3050F LDL-C >= 130 MG/DL: CPT | Performed by: STUDENT IN AN ORGANIZED HEALTH CARE EDUCATION/TRAINING PROGRAM

## 2024-09-04 PROCEDURE — 3074F SYST BP LT 130 MM HG: CPT | Performed by: STUDENT IN AN ORGANIZED HEALTH CARE EDUCATION/TRAINING PROGRAM

## 2024-09-04 PROCEDURE — 3060F POS MICROALBUMINURIA REV: CPT | Performed by: STUDENT IN AN ORGANIZED HEALTH CARE EDUCATION/TRAINING PROGRAM

## 2024-09-04 ASSESSMENT — PAIN SCALES - GENERAL: PAINLEVEL: 3

## 2024-09-04 NOTE — PROGRESS NOTES
39 M PMH: DM2, CORINNE,     Coming in today of thyroid evaluation, referred by PCP    Thyroid trends showing TSH as high as 9 about 8-9 months ago  Free T4 within range  TPO is positive     No recent contrast exposure  No amiodarone   No recent illness   No replacement in the past   Famhx-mother Graves, no autoimmune problems     DM2  Currently on metformin and ozempic  Controlled   Lost 80 lbs on ozempic     Physical activity-sedentary due to nerve pain/back pain     Social-works as      Past Medical History:   Diagnosis Date    Crushing injury of right great toe, sequela 10/10/2016    Crushing injury of great toe, right, sequela    Other chronic diseases of tonsils and adenoids 07/13/2016    Tonsil stone    Pain in left ankle and joints of left foot 03/01/2017    Left ankle pain, unspecified chronicity    Pain in left leg 03/01/2017    Musculoskeletal leg pain, left    Pain in left shoulder 03/01/2017    Left shoulder pain, unspecified chronicity    Personal history of other specified conditions 07/13/2016    History of chronic fatigue    Personal history of other specified conditions 07/13/2016    History of snoring    Sprain of ligaments of lumbar spine, initial encounter 07/13/2016    Low back sprain     No family history on file.  Social History     Socioeconomic History    Marital status: Single     Spouse name: Not on file    Number of children: Not on file    Years of education: Not on file    Highest education level: Not on file   Occupational History    Not on file   Tobacco Use    Smoking status: Never    Smokeless tobacco: Never   Substance and Sexual Activity    Alcohol use: Never    Drug use: Never    Sexual activity: Not on file   Other Topics Concern    Not on file   Social History Narrative    Not on file     Social Determinants of Health     Financial Resource Strain: Not on file   Food Insecurity: Not on file   Transportation Needs: Not on file   Physical Activity: Not on file    Stress: Not on file   Social Connections: Not on file   Intimate Partner Violence: Not on file   Housing Stability: Not on file     ROS reviewed and is negative except for pertinent findings noted on HPI    Physical Exam  Constitutional:       Appearance: Normal appearance.   HENT:      Head: Normocephalic.   Neck:      Comments: Heterogenous gland but no goiter   Abdominal:      Palpations: Abdomen is soft.   Musculoskeletal:         General: No swelling.      Comments: No proximal myopathy   Skin:     General: Skin is warm.      Comments: +acanthosis    Neurological:      General: No focal deficit present.      Mental Status: He is alert and oriented to person, place, and time.      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  Can do routine surveillance labs every 6 months, unless symptoms/clinical status changes  -TFT in Feb     DM2  Controlled on metformin and ozempic  Currently losing weight on ozempic, can continue current dose, and increase when he plateaus    Follow up as needed

## 2024-09-04 NOTE — PATIENT INSTRUCTIONS
Labs sometime in Feb    You do not need treatment at this time     Levothyroxine medicine will need to be taken for the rest of your life. Do not stop taking this medicine or change your dose without first checking with your doctor. It may take several weeks before you start to notice that your symptoms are better.    Swallow the capsule whole. Do not cut or crush it.    It is best to take this medicine on an empty stomach. Take it with a full glass of water at least 30 minutes to 1 hour before eating breakfast.    This medicine should be taken at least 4 hours before or 4 hours after these medicines: antacids (Maalox®, Mylanta®, Tums®), calcium supplements, stomach medicines (including lansoprazole, omeprazole, pantoprazole, Aciphex®, Dexilant®, Nexium®, Prevacid®, Prilosec®), cholestyramine (Prevalite®, Questran®), colesevelam (Welchol®), colestipol (Colestid®), iron supplements, Kayexalate®, orlistat (Josué®, Xenical®), simethicone (Gas-X®, Mylicon®), and sucralfate (Carafate®).    Cotton seed meal, dietary fiber, soybean flour (infant formula), or walnuts may affect the absorption of this medicine from your body. You may have to take this medicine at a different time of day from when you eat these foods. Talk with your doctor more about this if you have concerns.    Do not eat grapefruit or drink grapefruit juice while you are using this medicine    Follow up as needed     Irma Rosas MD  Divison of Endocrinology   [facility]   Phone: [phone]    option 4, then option 1  Fax: [phone]

## 2024-09-05 ENCOUNTER — HOSPITAL ENCOUNTER (OUTPATIENT)
Dept: NEUROLOGY | Facility: HOSPITAL | Age: 39
Discharge: HOME | End: 2024-09-05
Payer: COMMERCIAL

## 2024-09-05 DIAGNOSIS — R20.0 NUMBNESS: ICD-10-CM

## 2024-09-05 DIAGNOSIS — R29.898 LEG WEAKNESS, BILATERAL: ICD-10-CM

## 2024-09-05 PROCEDURE — 95909 NRV CNDJ TST 5-6 STUDIES: CPT | Performed by: PSYCHIATRY & NEUROLOGY

## 2024-09-05 PROCEDURE — 95886 MUSC TEST DONE W/N TEST COMP: CPT | Performed by: PSYCHIATRY & NEUROLOGY

## 2024-09-11 DIAGNOSIS — E11.9 TYPE 2 DIABETES MELLITUS WITHOUT COMPLICATION, WITHOUT LONG-TERM CURRENT USE OF INSULIN (MULTI): ICD-10-CM

## 2024-09-30 DIAGNOSIS — G89.29 CHRONIC BILATERAL LOW BACK PAIN WITH BILATERAL SCIATICA: Primary | ICD-10-CM

## 2024-09-30 DIAGNOSIS — M54.41 CHRONIC BILATERAL LOW BACK PAIN WITH BILATERAL SCIATICA: Primary | ICD-10-CM

## 2024-09-30 DIAGNOSIS — M54.42 CHRONIC BILATERAL LOW BACK PAIN WITH BILATERAL SCIATICA: Primary | ICD-10-CM

## 2024-10-03 RX ORDER — CELECOXIB 100 MG/1
200 CAPSULE ORAL DAILY
Qty: 180 CAPSULE | Refills: 1 | Status: SHIPPED | OUTPATIENT
Start: 2024-10-03

## 2024-10-03 RX ORDER — CELECOXIB 100 MG/1
200 CAPSULE ORAL DAILY
Qty: 90 CAPSULE | Refills: 1 | Status: SHIPPED | OUTPATIENT
Start: 2024-10-03 | End: 2024-10-03

## 2024-10-24 ENCOUNTER — APPOINTMENT (OUTPATIENT)
Dept: PRIMARY CARE | Facility: CLINIC | Age: 39
End: 2024-10-24

## 2024-11-07 ENCOUNTER — TELEPHONE (OUTPATIENT)
Dept: PRIMARY CARE | Facility: CLINIC | Age: 39
End: 2024-11-07
Payer: COMMERCIAL

## 2024-12-11 ENCOUNTER — LAB (OUTPATIENT)
Dept: LAB | Facility: LAB | Age: 39
End: 2024-12-11
Payer: COMMERCIAL

## 2024-12-11 ENCOUNTER — OFFICE VISIT (OUTPATIENT)
Dept: PRIMARY CARE | Facility: CLINIC | Age: 39
End: 2024-12-11
Payer: COMMERCIAL

## 2024-12-11 VITALS
BODY MASS INDEX: 36.45 KG/M2 | SYSTOLIC BLOOD PRESSURE: 122 MMHG | HEIGHT: 78 IN | TEMPERATURE: 97 F | DIASTOLIC BLOOD PRESSURE: 78 MMHG | RESPIRATION RATE: 16 BRPM | WEIGHT: 315 LBS | OXYGEN SATURATION: 97 % | HEART RATE: 84 BPM

## 2024-12-11 DIAGNOSIS — M54.17 LUMBOSACRAL RADICULOPATHY AT S1: Primary | ICD-10-CM

## 2024-12-11 DIAGNOSIS — Z12.5 SCREENING PSA (PROSTATE SPECIFIC ANTIGEN): ICD-10-CM

## 2024-12-11 DIAGNOSIS — E11.9 TYPE 2 DIABETES MELLITUS WITHOUT COMPLICATION, WITHOUT LONG-TERM CURRENT USE OF INSULIN (MULTI): ICD-10-CM

## 2024-12-11 DIAGNOSIS — Z80.42 FAMILY HISTORY OF PROSTATE CANCER: ICD-10-CM

## 2024-12-11 DIAGNOSIS — J45.20 MILD INTERMITTENT ASTHMA, UNSPECIFIED WHETHER COMPLICATED (HHS-HCC): ICD-10-CM

## 2024-12-11 DIAGNOSIS — L29.9 ITCHING OF EAR: ICD-10-CM

## 2024-12-11 DIAGNOSIS — E66.01 CLASS 3 SEVERE OBESITY WITH SERIOUS COMORBIDITY AND BODY MASS INDEX (BMI) OF 50.0 TO 59.9 IN ADULT, UNSPECIFIED OBESITY TYPE: ICD-10-CM

## 2024-12-11 DIAGNOSIS — E66.813 CLASS 3 SEVERE OBESITY WITH SERIOUS COMORBIDITY AND BODY MASS INDEX (BMI) OF 50.0 TO 59.9 IN ADULT, UNSPECIFIED OBESITY TYPE: ICD-10-CM

## 2024-12-11 DIAGNOSIS — H61.23 BILATERAL IMPACTED CERUMEN: ICD-10-CM

## 2024-12-11 LAB
EST. AVERAGE GLUCOSE BLD GHB EST-MCNC: 117 MG/DL
HBA1C MFR BLD: 5.7 %
PSA SERPL-MCNC: 0.31 NG/ML

## 2024-12-11 PROCEDURE — 99214 OFFICE O/P EST MOD 30 MIN: CPT | Performed by: FAMILY MEDICINE

## 2024-12-11 PROCEDURE — 36415 COLL VENOUS BLD VENIPUNCTURE: CPT

## 2024-12-11 PROCEDURE — 3074F SYST BP LT 130 MM HG: CPT | Performed by: FAMILY MEDICINE

## 2024-12-11 PROCEDURE — 3008F BODY MASS INDEX DOCD: CPT | Performed by: FAMILY MEDICINE

## 2024-12-11 PROCEDURE — 3078F DIAST BP <80 MM HG: CPT | Performed by: FAMILY MEDICINE

## 2024-12-11 PROCEDURE — 83036 HEMOGLOBIN GLYCOSYLATED A1C: CPT

## 2024-12-11 PROCEDURE — G0103 PSA SCREENING: HCPCS

## 2024-12-11 RX ORDER — FLUOCINOLONE ACETONIDE 0.11 MG/ML
5 OIL AURICULAR (OTIC) 2 TIMES DAILY
Qty: 20 ML | Refills: 0 | Status: SHIPPED | OUTPATIENT
Start: 2024-12-11

## 2024-12-11 RX ORDER — ALBUTEROL SULFATE AND BUDESONIDE 90; 80 UG/1; UG/1
2 AEROSOL, METERED RESPIRATORY (INHALATION) EVERY 6 HOURS PRN
Qty: 10.7 G | Refills: 2 | Status: SHIPPED | OUTPATIENT
Start: 2024-12-11

## 2024-12-11 RX ORDER — FLUOCINOLONE ACETONIDE 0.11 MG/ML
5 OIL AURICULAR (OTIC) 2 TIMES DAILY
Qty: 20 ML | Refills: 0 | Status: SHIPPED | OUTPATIENT
Start: 2024-12-11 | End: 2024-12-11

## 2024-12-11 NOTE — PATIENT INSTRUCTIONS
Numbness on the leg  - to call for neurology  - referral for acupuncture   - start duloxetine    Itchy ears with wax  - debrox    - ear oil/ steroid drops to try  -Let me know if not better    Continue to work on healthy diet and exercise  We encourage all patients to engage in exercise 150 minutes per week   Adults 18 and older, activity during each week - if you are able:    Engage in at least 150 minutes of moderate or vigorous exercise per week   If you are able- Engage in muscle strengthening activity on 2 or more days per week    Labs today      Please follow up in  as scheduled Feb or as needed.       ** If labs or imaging ordered at today's visit, all the non-urgent results will be discussed at your next visit    If you have been referred for a special test or to a specialist please call  [phone] to schedule an appointment.  If you have any further questions, or if develop new or worsened symptoms, please give our office a call at [phone].

## 2024-12-11 NOTE — PROGRESS NOTES
"Subjective   Patient ID: Bert Abraham is a 39 y.o. male who presents for Follow-up (Itchy ears).    HPI     Complains of dry itchy ears.  No drainage to the area.  No decreased hearing.    Complains of numbness of the leg.  Was with chronic back pain and better with using prednisone.  Completed physical therapy  Duloxetin was given- never started- would like to try      Diabetes   Hemoglobin A1c 5.9 <--- 6.4   With some slight weight loss since his last appointment.  Using Trulicity and metformin      Review of Systems  All systems reviewed and neg if not noted in the HPI above       Objective   /78 (Patient Position: Sitting)   Pulse 84   Temp 36.1 °C (97 °F)   Resp 16   Ht 1.981 m (6' 6\")   Wt (!) 209 kg (461 lb)   SpO2 97%   BMI 53.27 kg/m²     Physical Exam  Vitals reviewed.   Constitutional:       Appearance: Normal appearance.   HENT:      Head: Normocephalic.      Right Ear: There is impacted cerumen.      Left Ear: There is impacted cerumen.      Ears:      Mccord exam findings: Does not lateralize.  Eyes:      Extraocular Movements: Extraocular movements intact.   Cardiovascular:      Rate and Rhythm: Normal rate and regular rhythm.      Heart sounds: Normal heart sounds. No murmur heard.  Pulmonary:      Effort: Pulmonary effort is normal. No respiratory distress.      Breath sounds: Normal breath sounds. No wheezing.   Abdominal:      General: Bowel sounds are normal. There is no distension.      Palpations: Abdomen is soft.   Skin:     General: Skin is warm and dry.   Neurological:      General: No focal deficit present.      Mental Status: He is alert and oriented to person, place, and time.   Psychiatric:         Mood and Affect: Mood normal.         Behavior: Behavior normal.         Thought Content: Thought content normal.         Judgment: Judgment normal.         Assessment/Plan   Problem List Items Addressed This Visit             ICD-10-CM    Asthma J45.909    Relevant Medications "    albuterol-budesonide (Airsupra) 90-80 mcg/actuation inhaler    Type 2 diabetes mellitus without complication, without long-term current use of insulin (Multi) E11.9    Relevant Orders    Hemoglobin A1C (Completed)     Other Visit Diagnoses         Codes    Lumbosacral radiculopathy at S1    -  Primary  - to call for neurology  - referral for acupuncture   - start duloxetine M54.17    Relevant Orders    Referral to [facility]    Referral to Neurology    Screening PSA (prostate specific antigen)     Z12.5    Relevant Orders    PSA (Completed)    Family history of prostate cancer     Z80.42    Relevant Orders    PSA (Completed)    Referral to Genetics    Itching of ear     L29.9    Relevant Medications    fluocinolone (DermOtic) 0.01 % ear drops    Bilateral impacted cerumen     H61.23     Please follow up in  as scheduled Feb or as needed.

## 2025-02-10 ENCOUNTER — APPOINTMENT (OUTPATIENT)
Dept: PRIMARY CARE | Facility: CLINIC | Age: 40
End: 2025-02-10
Payer: COMMERCIAL

## 2025-03-03 ENCOUNTER — APPOINTMENT (OUTPATIENT)
Dept: INTEGRATIVE MEDICINE | Facility: CLINIC | Age: 40
End: 2025-03-03
Payer: COMMERCIAL

## 2025-04-02 ENCOUNTER — OFFICE VISIT (OUTPATIENT)
Dept: NEUROLOGY | Facility: CLINIC | Age: 40
End: 2025-04-02
Payer: COMMERCIAL

## 2025-04-02 VITALS
BODY MASS INDEX: 36.45 KG/M2 | HEIGHT: 78 IN | SYSTOLIC BLOOD PRESSURE: 145 MMHG | DIASTOLIC BLOOD PRESSURE: 87 MMHG | WEIGHT: 315 LBS

## 2025-04-02 DIAGNOSIS — M54.17 LUMBOSACRAL RADICULOPATHY AT S1: ICD-10-CM

## 2025-04-02 PROCEDURE — 99214 OFFICE O/P EST MOD 30 MIN: CPT | Performed by: SPECIALIST

## 2025-04-02 PROCEDURE — 3008F BODY MASS INDEX DOCD: CPT | Performed by: SPECIALIST

## 2025-04-02 PROCEDURE — 3077F SYST BP >= 140 MM HG: CPT | Performed by: SPECIALIST

## 2025-04-02 PROCEDURE — 99204 OFFICE O/P NEW MOD 45 MIN: CPT | Performed by: SPECIALIST

## 2025-04-02 PROCEDURE — 3079F DIAST BP 80-89 MM HG: CPT | Performed by: SPECIALIST

## 2025-04-02 ASSESSMENT — PATIENT HEALTH QUESTIONNAIRE - PHQ9
2. FEELING DOWN, DEPRESSED OR HOPELESS: NOT AT ALL
SUM OF ALL RESPONSES TO PHQ9 QUESTIONS 1 & 2: 0
2. FEELING DOWN, DEPRESSED OR HOPELESS: NOT AT ALL
1. LITTLE INTEREST OR PLEASURE IN DOING THINGS: NOT AT ALL
1. LITTLE INTEREST OR PLEASURE IN DOING THINGS: NOT AT ALL
SUM OF ALL RESPONSES TO PHQ9 QUESTIONS 1 AND 2: 0

## 2025-04-02 ASSESSMENT — ENCOUNTER SYMPTOMS
OCCASIONAL FEELINGS OF UNSTEADINESS: 0
DEPRESSION: 0
LOSS OF SENSATION IN FEET: 0

## 2025-04-02 NOTE — PATIENT INSTRUCTIONS
Chronic bilateral lumbosacral radiculopathy by history of degenerative disc disease of the lumbar spine with recent worsening and obesity for which I suggest:     Water therapy evaluation and treatment    X-ray of the lumbar spine with extension and flexion views    MRI of the lumbar spine without contrast.     Weight loss with BMI less than 25    Avoid prolonged lifting standing or sitting activities.    Non weight baring exercises ( swimming, Recumbent bike, rowing,...)     The patient to be followed up in my office  in 4 months or sooner as needed,

## 2025-04-02 NOTE — PROGRESS NOTES
Date of Service: 4/2/2025  Patient: Bert Abraham  MRN: [id number]  Referring Provider: Antonieta Monaco DO  Primary Care Physician: Antonieta Monaco DO     History of Present Illness:    Mr. Abraham is a 40 y.o. right handed male who presents for evaluation of a 9-month history of numbness down his left leg following a sciatica issue. The problem began a few months prior when he experienced pain and seizing sensations in his entire leg after prolonged standing, which he attributes to sitting in a tight chair. Initially, the pain was positional, but now it is constant. The patient reports forcing himself through the pain due to frustration with limited activity, which resulted in severe back pain. Subsequently, the back pain subsided, leaving persistent numbness in the left leg only.    An EMG nerve conduction study performed on September 5th was consistent with left S1 radiculopathy. The patient attempted to obtain approval for an MRI of the lumbar spine for 6 months without success. He denies any bowel or bladder difficulties but reports difficulty climbing stairs. The patient mentions a history of back trauma about a year ago while moving furniture at home. He works as an , primarily in a seated position at a computer. The patient has been taking Ozempic and doing light exercises, but for a period of about three months, he was unable to walk for more than a few minutes at a time. He has tried gabapentin for symptom management without significant improvement. Physical therapy was also attempted but provided no relief.    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 Exercise:  Recently started light exercises, previously unable to exercise due to pain. Diet and nutrition: On Ozempic (medication for weight management)    Review of Systems:  Musculoskeletal: Numbness down left leg for 9 months, difficulty going upstairs. Neurological: Pain shooting from back down to left leg, persistent numbness in left leg. Gastrointestinal: No difficulty holding bowel. Genitourinary: No difficulty holding bladder    Diagnostic Test Results:  - EMG nerve conduction study performed on September 5th: Consistent with left S1 radiculopathy    I have personally reviewed the patient's lab work and results for the last year:  Lab on 12/11/2024   Component Date Value Ref Range Status    Prostate Specific AG 12/11/2024 0.31  <=4.00 ng/mL Final    Hemoglobin A1C 12/11/2024 5.7 (H)  See comment % Final    Estimated Average Glucose 12/11/2024 117  Not Established mg/dL Final   Lab on 08/06/2024   Component Date Value Ref Range Status    Thyroid Stimulating Hormone 08/06/2024 2.88  0.44 - 3.98 mIU/L Final    Thyroxine, Free 08/06/2024 1.08  0.78 - 1.48 ng/dL Final    Triiodothyronine, Free 08/06/2024 3.5  2.3 - 4.2 pg/mL Final    Hemoglobin A1C 08/06/2024 5.9 (H)  see below % Final    Estimated Average Glucose 08/06/2024 123  Not Established mg/dL Final    Cholesterol 08/06/2024 202 (H)  0 - 199 mg/dL Final          Age      Desirable   Borderline High   High     0-19 Y     0 - 169       170 - 199     >/= 200    20-24 Y     0 - 189       190 - 224     >/= 225         >24 Y     0 - 199       200 - 239     >/= 240   **All ranges are based on fasting samples. Specific   therapeutic targets will vary based on patient-specific   cardiac risk.    Pediatric guidelines reference:Pediatrics 2011, 128(S5).Adult guidelines reference: NCEP ATPIII Guidelines,MATTHEW 2001, 258:2486-97    Venipuncture immediately after or during the administration of Metamizole may lead to falsely low results. Testing should be performed immediately  prior to Metamizole dosing.    HDL-Cholesterol 08/06/2024 57.3  mg/dL Final      Age       Very Low   Low     Normal    High    0-19 Y    < 35      < 40     40-45     ----  20-24 Y    ----     < 40      >45      ----        >24 Y      ----     < 40     40-60      >60      Cholesterol/HDL Ratio 08/06/2024 3.5   Final      Ref Values  Desirable  < 3.4  High Risk  > 5.0    LDL Calculated 08/06/2024 130 (H)  <=99 mg/dL Final                                Near   Borderline      AGE      Desirable  Optimal    High     High     Very High     0-19 Y     0 - 109     ---    110-129   >/= 130     ----    20-24 Y     0 - 119     ---    120-159   >/= 160     ----      >24 Y     0 -  99   100-129  130-159   160-189     >/=190      VLDL 08/06/2024 15  0 - 40 mg/dL Final    Triglycerides 08/06/2024 74  0 - 149 mg/dL Final       Age         Desirable   Borderline High   High     Very High   0 D-90 D    19 - 174         ----         ----        ----  91 D- 9 Y     0 -  74        75 -  99     >/= 100      ----    10-19 Y     0 -  89        90 - 129     >/= 130      ----    20-24 Y     0 - 114       115 - 149     >/= 150      ----         >24 Y     0 - 149       150 - 199    200- 499    >/= 500    Venipuncture immediately after or during the administration of Metamizole may lead to falsely low results. Testing should be performed immediately prior to Metamizole dosing.    Non HDL Cholesterol 08/06/2024 145  0 - 149 mg/dL Final          Age       Desirable   Borderline High   High     Very High     0-19 Y     0 - 119       120 - 144     >/= 145    >/= 160    20-24 Y     0 - 149       150 - 189     >/= 190      ----         >24 Y    30 mg/dL above LDL Cholesterol goal      Thyroid Peroxidase (TPO) Antibody 08/06/2024 >1,000 (H)  <=60 IU/mL Final    TSI 08/06/2024 <1.0  <=1.3 TSI index Final       Test Performed by:  [facility]  [address] [place] [address]  : Myriam HENRY  Gia Ph.D.; CLIA# [id number]    Anti-Thyroglobulin AB 08/06/2024 <0.9  0.0 - 4.0 IU/mL Final    INTERPRETIVE INFORMATION: Thyroglobulin Antibody                                    A value of 4.0 IU/mL or less indicates a negative result for   thyroglobulin antibodies.    The Thyroglobulin Antibody assay is being performed using the   Dayna Copybar Access DxI method.  Performed By: [facility]  [address] [place] [address]  : Tyrese Vieyra MD, PhD  CLIA Number: [id number]    Hepatitis C AB 08/06/2024 Nonreactive  Nonreactive Final    Results from patients taking biotin supplements or receiving high-dose biotin therapy should be interpreted with caution due to possible interference with this test. Providers may contact their local laboratory for further information.    Albumin, Urine Random 08/06/2024 55.0  Not established mg/L Final    Creatinine, Urine Random 08/06/2024 343.6  20.0 - 370.0 mg/dL Final    Albumin/Creatinine Ratio 08/06/2024 16.0  <30.0 ug/mg Creat Final   Lab on 04/23/2024   Component Date Value Ref Range Status    WBC 04/23/2024 10.2  4.4 - 11.3 x10*3/uL Final    nRBC 04/23/2024 0.0  0.0 - 0.0 /100 WBCs Final    RBC 04/23/2024 4.74  4.50 - 5.90 x10*6/uL Final    Hemoglobin 04/23/2024 13.8  13.5 - 17.5 g/dL Final    Hematocrit 04/23/2024 43.7  41.0 - 52.0 % Final    MCV 04/23/2024 92  80 - 100 fL Final    MCH 04/23/2024 29.1  26.0 - 34.0 pg Final    MCHC 04/23/2024 31.6 (L)  32.0 - 36.0 g/dL Final    RDW 04/23/2024 14.9 (H)  11.5 - 14.5 % Final    Platelets 04/23/2024 214  150 - 450 x10*3/uL Final    Neutrophils % 04/23/2024 59.2  40.0 - 80.0 % Final    Immature Granulocytes %, Automated 04/23/2024 0.5  0.0 - 0.9 % Final    Immature Granulocyte Count (IG) includes promyelocytes, myelocytes and metamyelocytes but does not include bands. Percent differential counts (%) should be interpreted in the context of the absolute cell counts (cells/UL).     Lymphocytes % 04/23/2024 32.7  13.0 - 44.0 % Final    Monocytes % 04/23/2024 7.1  2.0 - 10.0 % Final    Eosinophils % 04/23/2024 0.1  0.0 - 6.0 % Final    Basophils % 04/23/2024 0.4  0.0 - 2.0 % Final    Neutrophils Absolute 04/23/2024 6.03  1.20 - 7.70 x10*3/uL Final    Percent differential counts (%) should be interpreted in the context of the absolute cell counts (cells/uL).    Immature Granulocytes Absolute, Au* 04/23/2024 0.05  0.00 - 0.70 x10*3/uL Final    Lymphocytes Absolute 04/23/2024 3.33  1.20 - 4.80 x10*3/uL Final    Monocytes Absolute 04/23/2024 0.72  0.10 - 1.00 x10*3/uL Final    Eosinophils Absolute 04/23/2024 0.01  0.00 - 0.70 x10*3/uL Final    Basophils Absolute 04/23/2024 0.04  0.00 - 0.10 x10*3/uL Final    Glucose 04/23/2024 92  74 - 99 mg/dL Final    Sodium 04/23/2024 142  136 - 145 mmol/L Final    Potassium 04/23/2024 4.7  3.5 - 5.3 mmol/L Final    Chloride 04/23/2024 103  98 - 107 mmol/L Final    Bicarbonate 04/23/2024 29  21 - 32 mmol/L Final    Anion Gap 04/23/2024 15  10 - 20 mmol/L Final    Urea Nitrogen 04/23/2024 12  6 - 23 mg/dL Final    Creatinine 04/23/2024 1.06  0.50 - 1.30 mg/dL Final    eGFR 04/23/2024 90  >60 mL/min/1.73m*2 Final    Calculations of estimated GFR are performed using the 2021 CKD-EPI Study Refit equation without the race variable for the IDMS-Traceable creatinine methods.  https://jasn.asnjournals.org/content/early/2021/09/22/ASN.4176490121    Calcium 04/23/2024 9.4  8.6 - 10.6 mg/dL Final    Albumin 04/23/2024 3.9  3.4 - 5.0 g/dL Final    Alkaline Phosphatase 04/23/2024 69  33 - 120 U/L Final    Total Protein 04/23/2024 7.0  6.4 - 8.2 g/dL Final    AST 04/23/2024 15  9 - 39 U/L Final    Bilirubin, Total 04/23/2024 0.6  0.0 - 1.2 mg/dL Final    ALT 04/23/2024 24  10 - 52 U/L Final    Patients treated with Sulfasalazine may generate falsely decreased results for ALT.    Hemoglobin A1C 04/23/2024 6.4 (H)  see below % Final    Estimated Average Glucose 04/23/2024 137   Not Established mg/dL Final    Cholesterol 04/23/2024 162  0 - 199 mg/dL Final          Age      Desirable   Borderline High   High     0-19 Y     0 - 169       170 - 199     >/= 200    20-24 Y     0 - 189       190 - 224     >/= 225         >24 Y     0 - 199       200 - 239     >/= 240   **All ranges are based on fasting samples. Specific   therapeutic targets will vary based on patient-specific   cardiac risk.    Pediatric guidelines reference:Pediatrics 2011, 128(S5).Adult guidelines reference: NCEP ATPIII Guidelines,MATTHEW 2001, 258:2486-97    Venipuncture immediately after or during the administration of Metamizole may lead to falsely low results. Testing should be performed immediately prior to Metamizole dosing.    HDL-Cholesterol 04/23/2024 63.9  mg/dL Final      Age       Very Low   Low     Normal    High    0-19 Y    < 35      < 40     40-45     ----  20-24 Y    ----     < 40      >45      ----        >24 Y      ----     < 40     40-60      >60      Cholesterol/HDL Ratio 04/23/2024 2.5   Final      Ref Values  Desirable  < 3.4  High Risk  > 5.0    LDL Calculated 04/23/2024 61  <=99 mg/dL Final                                Near   Borderline      AGE      Desirable  Optimal    High     High     Very High     0-19 Y     0 - 109     ---    110-129   >/= 130     ----    20-24 Y     0 - 119     ---    120-159   >/= 160     ----      >24 Y     0 -  99   100-129  130-159   160-189     >/=190      VLDL 04/23/2024 37  0 - 40 mg/dL Final    Triglycerides 04/23/2024 184 (H)  0 - 149 mg/dL Final       Age         Desirable   Borderline High   High     Very High   0 D-90 D    19 - 174         ----         ----        ----  91 D- 9 Y     0 -  74        75 -  99     >/= 100      ----    10-19 Y     0 -  89        90 - 129     >/= 130      ----    20-24 Y     0 - 114       115 - 149     >/= 150      ----         >24 Y     0 - 149       150 - 199    200- 499    >/= 500    Venipuncture immediately after or during the  "administration of Metamizole may lead to falsely low results. Testing should be performed immediately prior to Metamizole dosing.    Non HDL Cholesterol 04/23/2024 98  0 - 149 mg/dL Final          Age       Desirable   Borderline High   High     Very High     0-19 Y     0 - 119       120 - 144     >/= 145    >/= 160    20-24 Y     0 - 149       150 - 189     >/= 190      ----         >24 Y    30 mg/dL above LDL Cholesterol goal      Thyroid Stimulating Hormone 04/23/2024 7.81 (H)  0.44 - 3.98 mIU/L Final    Iron 04/23/2024 70  35 - 150 ug/dL Final    UIBC 04/23/2024 201  110 - 370 ug/dL Final    TIBC 04/23/2024 271  240 - 445 ug/dL Final    % Saturation 04/23/2024 26  25 - 45 % Final    Ferritin 04/23/2024 255  20 - 300 ng/mL Final    Vitamin B12 04/23/2024 512  211 - 911 pg/mL Final    Folate, Serum 04/23/2024 8.1  >5.0 ng/mL Final    Retic % 04/23/2024 2.1 (H)  0.5 - 2.0 % Final    Retic Absolute 04/23/2024 0.101  0.022 - 0.118 x10*6/uL Final    Reticulocyte Hemoglobin 04/23/2024 32  28 - 38 pg Final    Immature Retic fraction 04/23/2024 11.8  <=16.0 % Final    Reticulocytes are measured based on a fluorescent technique. The IRF, or immature reticulocyte fraction, is the percent of reticulocytes that show medium (MFR) or high (HFR) fluorescence.  This value can be used to assess the relative maturity of the reticulocyte population in response to anemia. The \"shift reticulocytes\" are not measured by this technique, eliminating the need for their correction in the reticulocyte index.    Thyroxine, Free 04/23/2024 1.15  0.78 - 1.48 ng/dL Final            Problems Assessed/Relevant:  Problem List Items Addressed This Visit    None  Visit Diagnoses       Lumbosacral radiculopathy at S1              Past Medical History:   Diagnosis Date    Crushing injury of right great toe, sequela 10/10/2016    Crushing injury of great toe, right, sequela    Other chronic diseases of tonsils and adenoids 07/13/2016    Tonsil stone    " Pain in left ankle and joints of left foot 03/01/2017    Left ankle pain, unspecified chronicity    Pain in left leg 03/01/2017    Musculoskeletal leg pain, left    Pain in left shoulder 03/01/2017    Left shoulder pain, unspecified chronicity    Personal history of other specified conditions 07/13/2016    History of chronic fatigue    Personal history of other specified conditions 07/13/2016    History of snoring    Sprain of ligaments of lumbar spine, initial encounter 07/13/2016    Low back sprain     History reviewed. No pertinent surgical history.  Family History   Problem Relation Name Age of Onset    Graves' disease Mother      Prostate cancer Father      Diabetes Maternal Grandfather       Social History     Tobacco Use    Smoking status: Never    Smokeless tobacco: Never   Substance Use Topics    Alcohol use: Never      Allergies   Allergen Reactions    Phenobarbital Hives and Itching        Medications:    Current Outpatient Medications:     albuterol-budesonide (Airsupra) 90-80 mcg/actuation inhaler, Inhale 2 puffs every 6 hours if needed (SOB/cough)., Disp: 10.7 g, Rfl: 2    celecoxib (CeleBREX) 100 mg capsule, Take 2 capsules (200 mg) by mouth once daily., Disp: 180 capsule, Rfl: 1    cholecalciferol (Vitamin D-3) 50 mcg (2,000 unit) capsule, Take 1 capsule (50 mcg) by mouth once daily., Disp: , Rfl:     diclofenac sodium 1 % kit, Place on the skin., Disp: , Rfl:     fluocinolone (DermOtic) 0.01 % ear drops, Administer 5 drops into each ear 2 times a day., Disp: 20 mL, Rfl: 0    glycopyrrolate-formoteroL (Bevespi Aerosphere) 9-4.8 mcg HFA aerosol inhaler, Inhale 2 times a day., Disp: , Rfl:     semaglutide 2 mg/dose (8 mg/3 mL) pen injector, Inject 2 mg under the skin 1 (one) time per week., Disp: 9 mL, Rfl: 3    desvenlafaxine 50 mg 24 hr tablet, , Disp: , Rfl:     DULoxetine (Cymbalta) 30 mg DR capsule, Take 1 capsule (30 mg) by mouth once daily. X 2 wks, then increase to 60mg (#2 caps) daily po. Do  "not crush or chew., Disp: 180 capsule, Rfl: 0    metFORMIN  mg 24 hr tablet, TAKE 1 TABLET DAILY AS     DIRECTED (Patient not taking: Reported on 4/2/2025), Disp: 90 tablet, Rfl: 2           Results:     The following labs, imaging, and results were personally reviewed and demonstrated:    Labs:  Lab Results   Component Value Date    HGBA1C 5.7 (H) 12/11/2024       Lab Results   Component Value Date    [id number] 512 04/23/2024      IRON STUDIES:   Lab Results   Component Value Date    IRON 70 04/23/2024    TIBC 271 04/23/2024    FERRITIN 255 04/23/2024     MAGNESIUM: No components found for: \"MAGNESIUM\"  No components found for: \"THRYOIDSTIMU\"  No results found for: \"JD\", \"ANATITER\"  No results found for: \"CKTOTAL\"  No results found for: \"SPEP\", \"BETA2\"  CBC:   Lab Results   Component Value Date    WBC 10.2 04/23/2024    HGB 13.8 04/23/2024    HCT 43.7 04/23/2024     04/23/2024     BMP:   Lab Results   Component Value Date     04/23/2024    K 4.7 04/23/2024     04/23/2024    CO2 29 04/23/2024    BUN 12 04/23/2024    CREATININE 1.06 04/23/2024    CALCIUM 9.4 04/23/2024     LFT:   Lab Results   Component Value Date    ALKPHOS 69 04/23/2024    BILITOT 0.6 04/23/2024    PROT 7.0 04/23/2024    ALBUMIN 3.9 04/23/2024    ALT 24 04/23/2024    AST 15 04/23/2024             Physical Exam:     General Physical Exam:  /87 (Patient Position: Sitting)   Ht 1.981 m (6' 6\")   Wt (!) 191 kg (420 lb)   BMI 48.54 kg/m²      He is not in any acute distress.    Musculoskeletal: No scoliosis, lordosis, kyphosis, pes cavus, or hammertoes     Neurological Exam:   Mental status reveals: alert and oriented to person, place, and date. Speech is intact to conversation. Fund of knowledge is normal.     Cranial nerves:  CN 2   Visual fields full to confrontation.   CN 3, 4, 6   Pupils round, 4 mm in diameter, equally reactive to light. Lids symmetric; no ptosis. EOMs normal alignment, full range.   No " nystagmus.   CN 5   Facial sensation intact bilaterally.   CN 7   Normal and symmetric facial strength. Nasolabial folds symmetric.   CN 8   Hearing intact to conversation.   CN 9   Palate elevates symmetrically.   CN 11   Normal strength of shoulder shrug and neck turning.   CN 12   Tongue midline, with normal bulk and strength; no fasciculations.      Motor:  Muscle bulk and tone are normal. There are no scapular winging, fasciculations, tremor or other abnormal movement.    Neck flexion and neck extension are normal (MRC 5/5).    MANUAL MUSCLE TESTING IS AS FOLLOWS:    R L      5 5 Shoulder abduction   5 5 Elbow flexion   5 5 Elbow extension   5 5 Wrist flexion   5 5 Wrist extension   5 5 Finger flexion   5 5 Finger extension   5 5 Finger abduction   5 5 Thumb distal flexion   5 5 Thumb abduction     4 4 Hip flexion   5 5 Hip adduction   5 5 Hip abduction   5 5 Knee flexion   5 5 Knee extension   5 5 Ankle dorsiflexion   5 5 Ankle plantarflexion   5 5 Eversion   5 5 Inversion   5 5 Big toe extension   5 5 Toe flexion     Reflexes:   R L    1 1 Biceps    1 1 Brachioradialis    0 0 Triceps    0 0 Patellar   0 0 Achilles     Plantars: toes downgoing to plantar stimulation. No clonus or other pathologic reflexes present.      Sensory:   Decreased pinprick sensation in the left S1 dermatome.     Coordination:  Finger-nose-finger is normal without dysmetria or overshoot and heel-to-shin is normal. Rapid alternating movements in hands and feet are normal.     Gait:  Station is stable with a normal base. Gait is stable with a normal arm swing and speed. There is no ataxia, shuffling, steppage or waddling gait. Tandem gait is intact. Romberg sign is negative.      Impression/Plan:        Bert Abraham is a 40 y.o. who presents with Left S1 Radiculopathy    The patient presents with a 9-month history of left leg numbness following a sciatica issue. An EMG nerve conduction study performed on September 5th was consistent  with left S1 radiculopathy. The patient reports pain radiating from the back down the left leg, difficulty climbing stairs, and persistent numbness. The condition is likely exacerbated by the patient's weight and prolonged sitting at work. Previous treatments, including gabapentin and physical therapy, have been ineffective. The clinician suspects a pinched nerve in the lower back due to disc bulging, potentially caused by weakened core muscles and increased pressure from weight.    - Plan:    - Water therapy evaluation and treatment    - X-ray of the lumbar spine     - MRI of the lumbar spine with contrast    - Weight loss program    - Non-weight-bearing exercises: swimming, water walking, and recumbent bike    - Avoid weightlifting and treadmill    - Follow-up appointment in 4 months or sooner as needed.            No orders of the defined types were placed in this encounter.       Reviewed and approved by JENY CASTILLO on 4/2/25 at 2:05 PM.    I personally spent [45 ] minutes on the day of the visit completing the review of the medical record and outside records, obtaining history and performing an appropriate physical exam, patient care, counseling and education, placing orders, independently reviewing results, communicating with the patient/family and other providers, coordinating care and performing appropriate clinical documentation.    Jeny Castillo M.D.

## 2025-04-29 ENCOUNTER — APPOINTMENT (OUTPATIENT)
Dept: INTEGRATIVE MEDICINE | Facility: CLINIC | Age: 40
End: 2025-04-29
Payer: COMMERCIAL

## 2025-05-13 ENCOUNTER — APPOINTMENT (OUTPATIENT)
Dept: PRIMARY CARE | Facility: CLINIC | Age: 40
End: 2025-05-13
Payer: COMMERCIAL

## 2025-07-19 ENCOUNTER — OFFICE VISIT (OUTPATIENT)
Dept: URGENT CARE | Age: 40
End: 2025-07-19
Payer: COMMERCIAL

## 2025-07-19 VITALS
WEIGHT: 315 LBS | HEART RATE: 76 BPM | SYSTOLIC BLOOD PRESSURE: 137 MMHG | BODY MASS INDEX: 36.45 KG/M2 | TEMPERATURE: 98.3 F | OXYGEN SATURATION: 98 % | DIASTOLIC BLOOD PRESSURE: 87 MMHG | HEIGHT: 78 IN | RESPIRATION RATE: 20 BRPM

## 2025-07-19 DIAGNOSIS — H60.502 ACUTE OTITIS EXTERNA OF LEFT EAR, UNSPECIFIED TYPE: Primary | ICD-10-CM

## 2025-07-19 DIAGNOSIS — H61.22 LEFT EAR IMPACTED CERUMEN: ICD-10-CM

## 2025-07-19 DIAGNOSIS — H61.21 EXCESSIVE CERUMEN IN RIGHT EAR CANAL: ICD-10-CM

## 2025-07-19 RX ORDER — AMOXICILLIN AND CLAVULANATE POTASSIUM 875; 125 MG/1; MG/1
1 TABLET, FILM COATED ORAL 2 TIMES DAILY
Qty: 14 TABLET | Refills: 0 | Status: SHIPPED | OUTPATIENT
Start: 2025-07-19 | End: 2025-07-26

## 2025-07-19 RX ORDER — ASPIRIN 81 MG
5 TABLET, DELAYED RELEASE (ENTERIC COATED) ORAL 2 TIMES DAILY
Qty: 15 ML | Refills: 0 | Status: SHIPPED | OUTPATIENT
Start: 2025-07-19 | End: 2025-07-23

## 2025-07-19 RX ORDER — OFLOXACIN 3 MG/ML
SOLUTION/ DROPS OPHTHALMIC
Qty: 5 ML | Refills: 0 | Status: SHIPPED | OUTPATIENT
Start: 2025-07-19

## 2025-07-19 NOTE — PATIENT INSTRUCTIONS
Discontinue use of qtips  Keep ears completely dry  Do not use ear buds. You may re-use them once symptoms resolve and treatment complete however I recommend that you sanitize them well with alcohol wipe before re-using  You may use debrox drops in the right ear. Avoid using them in the left ear until your symptoms resolved and the treatment is complete

## 2025-07-19 NOTE — PROGRESS NOTES
"Subjective   Patient ID: Bert Abraham is a 40 y.o. male. They present today with a chief complaint of Earache (Sx started last night in the left ear. +hearing loss, +Bleeding, slight dizziness).    History of Present Illness  Pt presents with left ear problem. Last night, hearing felt muffled. Hx of cerumen impaction so he tried using qtip and flushing with water pik without relief. This morning, he tried to use qtips again and noticed blood on the qtip. There is no ear pain or continued drainage/bleeding from the ear. No fever, headache, uri sx. Felt some dizziness last night, resolved today.       History provided by:  Patient  Earache   Associated symptoms include ear discharge.       Past Medical History  Allergies as of 07/19/2025 - Reviewed 07/19/2025   Allergen Reaction Noted    Phenobarbital Hives and Itching 04/29/1990       Prescriptions Prior to Admission[1]     Medical History[2]    Surgical History[3]     reports that he has never smoked. He has never used smokeless tobacco. He reports that he does not drink alcohol and does not use drugs.    Review of Systems  Review of Systems   HENT:  Positive for ear discharge and ear pain.    All other systems reviewed and are negative.                                 Objective    Vitals:    07/19/25 0922   BP: 137/87   Pulse: 76   Resp: 20   Temp: 36.8 °C (98.3 °F)   SpO2: 98%   Weight: (!) 191 kg (420 lb)   Height: (!) 1.981 m (6' 6\")     No LMP for male patient.    Physical Exam  Vitals and nursing note reviewed.   Constitutional:       General: He is not in acute distress.     Appearance: Normal appearance. He is not ill-appearing, toxic-appearing or diaphoretic.   HENT:      Head: Normocephalic and atraumatic.      Left Ear: Swelling present. No tenderness. There is impacted cerumen. No foreign body. No mastoid tenderness.      Ears:      Comments: Excessive cerumen in the R ear canal but not impacted, otherwise normal    The left ear canal is edematous, " erythematous. Appears to be scant bleeding/dried blood on the ear canal as well. There is impacted cerumen abutting the L TM. Unable to visualize the L TM due to impaction.         Neurological:      Mental Status: He is alert.         Procedures    Point of Care Test & Imaging Results from this visit  No results found for this visit on 07/19/25.   Imaging  No results found.    Cardiology, Vascular, and Other Imaging  No other imaging results found for the past 2 days      Diagnostic study results (if any) were reviewed by Debbie Lovelace PA-C.    Assessment/Plan   Allergies, medications, history, and pertinent labs/EKGs/Imaging reviewed by Debbie Lovelace PA-C.     Medical Decision Making  Left ear was not irrigated given acute associated AOE. Will treat with ofloxacin gtt. Given that I cannot see the L TM, I will also treat with Augmentin. Pt requesting refill of debrox for hx of cerumen impaction, he may use this in the R ear but advised to not use  in the L ear until sx resolve and treatment complete. Discontinue use of qtips. Keep ears completely dry. Return here to urgent care as needed for worsening, new or non improving symptoms.     Orders and Diagnoses  Diagnoses and all orders for this visit:  Acute otitis externa of left ear, unspecified type  -     amoxicillin-clavulanate (Augmentin) 875-125 mg tablet; Take 1 tablet by mouth 2 times a day for 7 days. Please discard additional 6 tablets remaining in bottle when finished with script  -     ofloxacin (Ocuflox) 0.3 % ophthalmic solution; Instill 10 drops into the left ear once daily for 7 days  Left ear impacted cerumen  Excessive cerumen in right ear canal  -     carbamide peroxide (Debrox) 6.5 % otic solution; Administer 5 drops into the right ear 2 times a day for 4 days.      Medical Admin Record      Patient disposition: Home    Electronically signed by Debbie Lovelace PA-C  9:29 AM           [1] (Not in a hospital admission)  [2]   Past Medical  History:  Diagnosis Date    Crushing injury of right great toe, sequela 10/10/2016    Crushing injury of great toe, right, sequela    Other chronic diseases of tonsils and adenoids 07/13/2016    Tonsil stone    Pain in left ankle and joints of left foot 03/01/2017    Left ankle pain, unspecified chronicity    Pain in left leg 03/01/2017    Musculoskeletal leg pain, left    Pain in left shoulder 03/01/2017    Left shoulder pain, unspecified chronicity    Personal history of other specified conditions 07/13/2016    History of chronic fatigue    Personal history of other specified conditions 07/13/2016    History of snoring    Sprain of ligaments of lumbar spine, initial encounter 07/13/2016    Low back sprain   [3] History reviewed. No pertinent surgical history.

## 2025-07-28 ENCOUNTER — APPOINTMENT (OUTPATIENT)
Dept: NEUROLOGY | Facility: CLINIC | Age: 40
End: 2025-07-28
Payer: COMMERCIAL

## 2025-07-30 ENCOUNTER — APPOINTMENT (OUTPATIENT)
Dept: RADIOLOGY | Facility: HOSPITAL | Age: 40
End: 2025-07-30
Payer: COMMERCIAL

## 2025-07-30 DIAGNOSIS — M54.17 LUMBOSACRAL RADICULOPATHY AT S1: ICD-10-CM

## 2025-07-30 PROCEDURE — 72148 MRI LUMBAR SPINE W/O DYE: CPT | Performed by: RADIOLOGY

## 2025-07-30 PROCEDURE — 72148 MRI LUMBAR SPINE W/O DYE: CPT

## 2025-08-13 ENCOUNTER — APPOINTMENT (OUTPATIENT)
Dept: OTOLARYNGOLOGY | Facility: CLINIC | Age: 40
End: 2025-08-13
Payer: COMMERCIAL

## 2025-08-13 VITALS — BODY MASS INDEX: 48.54 KG/M2 | WEIGHT: 315 LBS

## 2025-08-13 DIAGNOSIS — K21.9 LARYNGOPHARYNGEAL REFLUX (LPR): ICD-10-CM

## 2025-08-13 DIAGNOSIS — H62.40 FUNGAL OTITIS EXTERNA: ICD-10-CM

## 2025-08-13 DIAGNOSIS — B36.9 FUNGAL OTITIS EXTERNA: ICD-10-CM

## 2025-08-13 DIAGNOSIS — H61.899 LESION OF EAR CANAL: ICD-10-CM

## 2025-08-13 PROCEDURE — 69210 REMOVE IMPACTED EAR WAX UNI: CPT | Performed by: GENERAL PRACTICE

## 2025-08-13 PROCEDURE — G8433 SCR FOR DEP NOT CPT DOC RSN: HCPCS | Performed by: GENERAL PRACTICE

## 2025-08-13 PROCEDURE — 99204 OFFICE O/P NEW MOD 45 MIN: CPT | Performed by: GENERAL PRACTICE

## 2025-08-13 RX ORDER — FAMOTIDINE 20 MG/1
20 TABLET, FILM COATED ORAL NIGHTLY
Qty: 30 TABLET | Refills: 3 | Status: SHIPPED | OUTPATIENT
Start: 2025-08-13 | End: 2026-08-13

## 2025-08-13 RX ORDER — CLOTRIMAZOLE 1 G/ML
SOLUTION TOPICAL 2 TIMES DAILY
Qty: 7 ML | Refills: 1 | Status: SHIPPED | OUTPATIENT
Start: 2025-08-13 | End: 2025-08-20

## 2025-08-17 LAB
BACTERIA EAR AEROBE CULT: NORMAL
BACTERIA SPEC AEROBE CULT: NORMAL
BACTERIA SPEC ANAEROBE CULT: NORMAL

## 2025-09-10 ENCOUNTER — APPOINTMENT (OUTPATIENT)
Dept: OTOLARYNGOLOGY | Facility: CLINIC | Age: 40
End: 2025-09-10
Payer: COMMERCIAL